# Patient Record
Sex: FEMALE | Race: WHITE | Employment: FULL TIME | URBAN - METROPOLITAN AREA
[De-identification: names, ages, dates, MRNs, and addresses within clinical notes are randomized per-mention and may not be internally consistent; named-entity substitution may affect disease eponyms.]

---

## 2018-07-23 ENCOUNTER — OFFICE VISIT (OUTPATIENT)
Dept: FAMILY MEDICINE CLINIC | Age: 23
End: 2018-07-23

## 2018-07-23 VITALS
HEART RATE: 82 BPM | WEIGHT: 188.6 LBS | TEMPERATURE: 98.2 F | SYSTOLIC BLOOD PRESSURE: 120 MMHG | OXYGEN SATURATION: 98 % | RESPIRATION RATE: 18 BRPM | HEIGHT: 67 IN | BODY MASS INDEX: 29.6 KG/M2 | DIASTOLIC BLOOD PRESSURE: 82 MMHG

## 2018-07-23 DIAGNOSIS — G43.109 MIGRAINE WITH AURA AND WITHOUT STATUS MIGRAINOSUS, NOT INTRACTABLE: ICD-10-CM

## 2018-07-23 DIAGNOSIS — Z00.00 WELL ADULT HEALTH CHECK: Primary | ICD-10-CM

## 2018-07-23 DIAGNOSIS — K58.0 IRRITABLE BOWEL SYNDROME WITH DIARRHEA: ICD-10-CM

## 2018-07-23 DIAGNOSIS — R29.898 HAND WEAKNESS: ICD-10-CM

## 2018-07-23 DIAGNOSIS — Z00.00 WELL ADULT HEALTH CHECK: ICD-10-CM

## 2018-07-23 DIAGNOSIS — R53.83 MALAISE AND FATIGUE: ICD-10-CM

## 2018-07-23 DIAGNOSIS — R53.81 MALAISE AND FATIGUE: ICD-10-CM

## 2018-07-23 DIAGNOSIS — N94.6 DYSMENORRHEA: ICD-10-CM

## 2018-07-23 DIAGNOSIS — R73.9 HYPERGLYCEMIA: ICD-10-CM

## 2018-07-23 LAB
A/G RATIO: 1.8 (ref 1.1–2.2)
ALBUMIN SERPL-MCNC: 4.8 G/DL (ref 3.4–5)
ALP BLD-CCNC: 77 U/L (ref 40–129)
ALT SERPL-CCNC: 26 U/L (ref 10–40)
ANION GAP SERPL CALCULATED.3IONS-SCNC: 16 MMOL/L (ref 3–16)
AST SERPL-CCNC: 23 U/L (ref 15–37)
BASOPHILS ABSOLUTE: 0.1 K/UL (ref 0–0.2)
BASOPHILS RELATIVE PERCENT: 1 %
BILIRUB SERPL-MCNC: 0.3 MG/DL (ref 0–1)
BUN BLDV-MCNC: 13 MG/DL (ref 7–20)
CALCIUM SERPL-MCNC: 10.2 MG/DL (ref 8.3–10.6)
CHLORIDE BLD-SCNC: 102 MMOL/L (ref 99–110)
CHOLESTEROL, TOTAL: 221 MG/DL (ref 0–199)
CO2: 23 MMOL/L (ref 21–32)
CREAT SERPL-MCNC: 0.7 MG/DL (ref 0.6–1.1)
EOSINOPHILS ABSOLUTE: 0.5 K/UL (ref 0–0.6)
EOSINOPHILS RELATIVE PERCENT: 7.7 %
GFR AFRICAN AMERICAN: >60
GFR NON-AFRICAN AMERICAN: >60
GLOBULIN: 2.6 G/DL
GLUCOSE BLD-MCNC: 88 MG/DL (ref 70–99)
HCT VFR BLD CALC: 40.6 % (ref 36–48)
HDLC SERPL-MCNC: 50 MG/DL (ref 40–60)
HEMOGLOBIN: 13.8 G/DL (ref 12–16)
LDL CHOLESTEROL CALCULATED: 119 MG/DL
LYMPHOCYTES ABSOLUTE: 2.9 K/UL (ref 1–5.1)
LYMPHOCYTES RELATIVE PERCENT: 40.9 %
MCH RBC QN AUTO: 30.5 PG (ref 26–34)
MCHC RBC AUTO-ENTMCNC: 33.9 G/DL (ref 31–36)
MCV RBC AUTO: 90 FL (ref 80–100)
MONOCYTES ABSOLUTE: 0.4 K/UL (ref 0–1.3)
MONOCYTES RELATIVE PERCENT: 6.1 %
NEUTROPHILS ABSOLUTE: 3.1 K/UL (ref 1.7–7.7)
NEUTROPHILS RELATIVE PERCENT: 44.3 %
PDW BLD-RTO: 12.9 % (ref 12.4–15.4)
PLATELET # BLD: 279 K/UL (ref 135–450)
PMV BLD AUTO: 9 FL (ref 5–10.5)
POTASSIUM SERPL-SCNC: 4.9 MMOL/L (ref 3.5–5.1)
RBC # BLD: 4.51 M/UL (ref 4–5.2)
SEDIMENTATION RATE, ERYTHROCYTE: 10 MM/HR (ref 0–20)
SODIUM BLD-SCNC: 141 MMOL/L (ref 136–145)
TOTAL PROTEIN: 7.4 G/DL (ref 6.4–8.2)
TRIGL SERPL-MCNC: 261 MG/DL (ref 0–150)
TSH SERPL DL<=0.05 MIU/L-ACNC: 3.08 UIU/ML (ref 0.27–4.2)
VLDLC SERPL CALC-MCNC: 52 MG/DL
WBC # BLD: 7 K/UL (ref 4–11)

## 2018-07-23 PROCEDURE — 99202 OFFICE O/P NEW SF 15 MIN: CPT | Performed by: FAMILY MEDICINE

## 2018-07-23 PROCEDURE — 99385 PREV VISIT NEW AGE 18-39: CPT | Performed by: FAMILY MEDICINE

## 2018-07-23 RX ORDER — NAPROXEN 500 MG/1
500 TABLET ORAL 2 TIMES DAILY PRN
Qty: 30 TABLET | Refills: 3 | COMMUNITY
Start: 2018-07-23 | End: 2019-10-18 | Stop reason: SDUPTHER

## 2018-07-23 RX ORDER — NORGESTIMATE AND ETHINYL ESTRADIOL 0.25-0.035
1 KIT ORAL DAILY
COMMUNITY
End: 2018-07-23

## 2018-07-23 RX ORDER — HYOSCYAMINE SULFATE EXTENDED-RELEASE 0.38 MG/1
0.38 TABLET ORAL EVERY 12 HOURS PRN
Qty: 60 TABLET | Refills: 5 | Status: SHIPPED | OUTPATIENT
Start: 2018-07-23

## 2018-07-23 RX ORDER — NORGESTIMATE AND ETHINYL ESTRADIOL 0.25-0.035
1 KIT ORAL DAILY
Qty: 1 PACKET | Refills: 3 | Status: SHIPPED | OUTPATIENT
Start: 2018-07-23 | End: 2019-10-18 | Stop reason: SDUPTHER

## 2018-07-23 ASSESSMENT — PATIENT HEALTH QUESTIONNAIRE - PHQ9
SUM OF ALL RESPONSES TO PHQ QUESTIONS 1-9: 0
SUM OF ALL RESPONSES TO PHQ9 QUESTIONS 1 & 2: 0
1. LITTLE INTEREST OR PLEASURE IN DOING THINGS: 0
2. FEELING DOWN, DEPRESSED OR HOPELESS: 0

## 2018-07-23 NOTE — PATIENT INSTRUCTIONS
INSTRUCTIONS  · NEXT APPOINTMENT: Please schedule check-up in 2 months. · PLEASE TAKE THIS FORM TO CHECK-OUT WINDOW TO SCHEDULE NEXT VISIT. · PLEASE GET BLOODWORK DRAWN TODAY ON FIRST FLOOR in 170. Take orders with you. RESULTS- most blood tests back in couple days. We will call you if any problems. If bloodwork good, you will get letter in mail or notified thru [address] (if signed up) within 2 weeks. If you do not, please call office. · Please get flu vaccine when available in fall. Can get either at this office or at stores such as [employer] and [address]. · Ask GYN to fax PAP and chlamydia results to Dr. Gray Loges at [phone]. · Call to get date of last tetanus. · See ortho and schedule EMG about hand. · Take levbid twice a day for bowels. · Increase fiber in diet. Patient Education     IRRITABLE BOWEL SYNDROME    Overview   What is irritable bowel syndrome? Irritable bowel syndrome (IBS) is a chronic (ongoing) problem with the large intestine. In people who have IBS, food moves too quickly or too slowly through the intestines. This can cause pain or discomfort (see symptoms below) and emotional distress, but it does not damage the large intestine. IBS is very common and occurs more often in women. IBS is also called functional bowel syndrome, irritable colon, spastic bowel and spastic colon. It's not the same as inflammatory bowel diseases like ulcerative colitis. Symptoms   What are the symptoms of IBS?   Common IBS symptoms  Bloating and gas   Mucus in the stool   Constipation   Diarrhea, especially after eating or first thing in the morning   Alternating between constipation and diarrhea   Feeling like you still need to have a bowel movement after you've already had one   Feeling a strong urge to have a bowel movement   Abdominal pain and cramping that may go away after having a bowel movement   The symptoms may get worse when you're under stress, such as when you travel, attend social events or change your daily routine. Your symptoms may also get worse if you don't eat enough healthy foods or after you've eaten a big meal. Some people are bothered by certain foods. Women who have IBS may notice more frequent symptoms during their menstrual periods. Causes & Risk Factors   Do certain foods cause IBS? No. Foods don't cause IBS. But some foods may make you feel worse. Foods that may make symptoms worse include the following:  Drinks with caffeine, such as coffee, tea or soda   Milk products   Alcohol   Chocolate   Wheat, rye or barley   Keeping a diary for a few weeks may be a good way to find out if a food bothers you. Record what you eat and what your symptoms are. If you notice a pattern or think a food makes you feel worse, don't eat it. But don't cut out foods unless they have caused you problems more than once. If gas is a problem for you, you might want to avoid foods that tend to make gas worse. These include beans, cabbage and some fruits. If milk and other dairy products bother you, you may have lactose intolerance. Lactose intolerance means that your body can't digest lactose (the sugar in milk). If this seems to be the case, you may need to limit the amount of milk and milk products in your diet. Talk to your family doctor if you think you have trouble digesting dairy products. How can stress affect IBS? Stress may trigger symptoms in people who have IBS. Talk to your family doctor about ways to deal with stress, such as exercise, relaxation training or meditation. He or she may have some suggestions or may refer you to someone who can give you some ideas. Your doctor may also suggest that you talk to a counselor about things that are bothering you. Diagnosis & Tests   How is IBS diagnosed? Your doctor may start by asking you questions about your symptoms. If your symptoms have had a pattern over time, the pattern may make it clear to your doctor that IBS is the cause.     If your symptoms have just started, something else may be the cause. Your doctor may need to do some tests, such as a blood test or colonoscopy, to make sure that your symptoms aren't caused by something else. Treatment   How is IBS treated? There is no cure for IBS. The best way to handle your symptoms is to eat a healthy diet, avoid foods that seem to make you feel worse and find ways to handle your stress. Why may fiber be helpful? Fiber can be helpful because it improves how the intestines work. There are 2 types of fiber:  Soluble fiber helps both diarrhea and constipation. It dissolves in water and forms a gel-like material. Many foods, such as apples, beans and citrus fruits, contain soluble fiber. Psyllium, a natural vegetable fiber, is a also a soluble fiber. You can buy psyllium supplements (some brand names: Fiberall, Metamucil, Perdiem) to drink and you can add it to other foods. Insoluble fiber helps constipation by moving material through your digestive system and adding bulk to your stool. Insoluble fiber is in whole grain breads, wheat bran and many vegetables. Increase the fiber in your diet slowly. Some people feel bloated and have gas if they increase their fiber intake too quickly. Gas and bloating usually improve as you get used to eating more fiber. The best way to increase your fiber intake is eat a wide variety of high fiber foods. Can my doctor prescribe medicine for IBS? If your symptoms are severe, your doctor may prescribe medicine to help you manage or lessen your symptoms. For example, if your main symptom is pain, your doctor may prescribe antispasmodic medicines such as hyoscyamine or dicyclomine to reduce cramping. Heating pads and hot baths can also be comforting. If diarrhea is a frequent problem, medicine such as loperamide (brand name: Imodium) may help.     Your doctor may give you tranquilizers or sedatives for short periods to treat anxiety that may be making should aim for at least 30 grams of fiber daily. Women 48years of age and younger should consume at least 25 grams of fiber per day, while women older than 48years of age should aim for at least 21 grams of fiber daily. Try the following ideas to increase the fiber in your diet:  Eat at least 2 cups of fruits and 2 1/2 cups of vegetables each day. Fruits and vegetables that are high in fiber include:   Beans such as:   navy (1/2 cup = 9.5 grams),   kidney (1/2 cup = 8.2 grams),   yuen (1/2 cup = 7.7 grams),   black (1/2 cup = 7.5),   lima (1/2 = 6.6 grams),   white (1/2 cup = 6.3 grams)   great northern (1/2 cup = 6.2 grams). Artichokes (1 artichoke = 6.5 grams)   Sweet potatoes (1 medium sweet potato = 4.8 grams)   Pears (I small pear = 4.4 grams)   Green peas (1/2 cup = 4.4 grams)   Berries such as raspberries (1/2 cup = 4.0 grams) and blackberries (1/2 cup = 3.8 grams)   Prunes (1/2 cup = 3.8 grams)   Figs and dates (1/4 cup = 3.6 grams)   Spinach (1/2 cup = 3.5 grams)   Apples (1 medium apple = 3.3 grams)   Oranges (I medium orange = 3.1 grams)   Replace refined white bread with whole-grain breads and cereals. Eat brown rice instead of white rice. Eat more of the following foods:   Bran muffins   Oatmeal   Bran or multiple-grain cereals, cooked or dry   Brown rice   Popcorn   100% whole-wheat bread   When eating store-bought foods, check the nutrition information labels for the amounts of dietary fiber in each product. Aim for 5 grams of fiber per serving. Add 1/4 cup of wheat bran (cotton's bran) to foods such as cooked cereal, applesauce or meat loaf. Eat beans each week. Start slowly  When you first add fiber to your diet you may notice bloating, cramping or gas. But you can prevent this by making smaller changes in your diet over a period of time. Start with one of the changes listed above, then wait several days to a week before making another.  If one change doesn't seem to work for you, try a

## 2018-07-23 NOTE — PROGRESS NOTES
for: TSH  No results found for: LABA1C  No results found for: PSA, PSADIA    PHYSICAL-VISIT NOTE   Subjective:     Chief Complaint   Patient presents with    New Patient       Deepali Buchanan is a 23 y.o. female who presents for annual testing/preventive review and check-up of medical problems listed below:  1. Well adult health check    2. Migraine with aura and without status migrainosus, not intractable    3. Dysmenorrhea    4. Hyperglycemia    5. Irritable bowel syndrome with diarrhea    6. Malaise and fatigue    7. Hand weakness       Complaints: diarrhea    Migraine with visual aura, less than 1/mo, able to stop most with fluids, rest and naproxen 500  Hx of high glucose and insulin on past.    Dairy, high sugar and greasy foods brings on diarrhea, several times a week. No blood, fever     Hands get stiff, L>R, L handed, worse 4th and 5th fingers after couple hours worse and curl up. Dropping things. Sometimes numb/no feeling. Loosens quickly in AM  X 2 years    Review of Systems  A comprehensive review of systems was negative with the EXCEPTION of notes above. See scanned in system review check-list.    Patient's medications, allergies, past medical, surgical, social and family histories were reviewed and updated as appropriate (see above). Objective:   PHYSICAL EXAM   /82 (Site: Right Arm, Position: Sitting, Cuff Size: Small Adult)   Pulse 82   Temp 98.2 °F (36.8 °C) (Oral)   Resp 18   Ht 5' 7\" (1.702 m)   Wt 188 lb 9.6 oz (85.5 kg)   LMP 07/18/2018   SpO2 98%   BMI 29.54 kg/m²   Blood pressure is Excellent. BP Readings from Last 5 Encounters:   07/23/18 120/82     Wt Readings from Last 5 Encounters:   07/23/18 188 lb 9.6 oz (85.5 kg)      GENERAL:   · well-developed, well-nourished, alert, no distress. EYES: glasses  · External findings: lids and lashes normal and conjunctivae and sclerae normal  · Eyes: no periorbital cellulitis.   ENT:   · External nose and ears appear

## 2018-07-24 LAB
ESTIMATED AVERAGE GLUCOSE: 99.7 MG/DL
HBA1C MFR BLD: 5.1 %

## 2018-08-30 ENCOUNTER — TELEPHONE (OUTPATIENT)
Dept: FAMILY MEDICINE CLINIC | Age: 23
End: 2018-08-30

## 2018-08-30 ENCOUNTER — HOSPITAL ENCOUNTER (OUTPATIENT)
Dept: NEUROLOGY | Age: 23
Discharge: HOME | End: 2018-08-30
Attending: FAMILY MEDICINE | Admitting: FAMILY MEDICINE

## 2018-08-30 DIAGNOSIS — R29.898 OTHER SYMPTOMS AND SIGNS INVOLVING THE MUSCULOSKELETAL SYSTEM: ICD-10-CM

## 2018-09-07 ENCOUNTER — OFFICE VISIT (OUTPATIENT)
Dept: ORTHOPEDIC SURGERY | Age: 23
End: 2018-09-07

## 2018-09-07 ENCOUNTER — TELEPHONE (OUTPATIENT)
Dept: ORTHOPEDIC SURGERY | Age: 23
End: 2018-09-07

## 2018-09-07 VITALS
HEART RATE: 75 BPM | BODY MASS INDEX: 29.51 KG/M2 | HEIGHT: 67 IN | SYSTOLIC BLOOD PRESSURE: 127 MMHG | DIASTOLIC BLOOD PRESSURE: 77 MMHG | WEIGHT: 188 LBS

## 2018-09-07 DIAGNOSIS — G56.03 BILATERAL CARPAL TUNNEL SYNDROME: ICD-10-CM

## 2018-09-07 DIAGNOSIS — G56.23 CUBITAL TUNNEL SYNDROME OF BOTH UPPER EXTREMITIES: ICD-10-CM

## 2018-09-07 DIAGNOSIS — R29.898 HAND WEAKNESS: Primary | ICD-10-CM

## 2018-09-07 PROCEDURE — 99243 OFF/OP CNSLTJ NEW/EST LOW 30: CPT | Performed by: ORTHOPAEDIC SURGERY

## 2018-09-07 NOTE — LETTER
CONSENT TO OPERATION  AND/OR OTHER PROCEDURE(S)          PATIENT : Miguelangel Hannah   YOB: 1995      DATE : 9/7/18          1. I request and consent that Dr. Anahi Rivas. Fabiano Ellison,  and/or his associates or assistants perform an operation and/or procedures on the above patient at  [place] [address], to treat the condition(s) which appear indicated by the diagnostic studies already performed. I have been told that in general terms the nature, purpose and reasonable expectations of the operation and/or procedure(s) are:       left  Ulnar Nerve Decompression  (at Elbow) &  Left Carpal Tunnel Release followed 3 week later by Right ulnar nerve decompression & Right Carpal tunnel release        2. It has been explained to me by the informing physician that during the course of the operation and/or procedure(s) unforeseen conditions may be revealed that necessitate an extension of the original operation and/or procedure(s) or different operation and/or procedures than those set forth in Paragraph 1. I therefore authorize and request that my physician and/or his associates or assistants perform such operations and/or procedures as are necessary and desirable in the exercise of professional judgment. The authority granted under this Paragraph 2 shall extend to all conditions that require treatment and are known to my physician at the time the operation is commenced. 3. I have been made aware by the informing physician of certain risks and consequences that are associated with the operation and/or procedure(s) described in Paragraph 1, the reasonable alternative methods or treatment, the possible consequences, the possibility that the operation and/or procedure(s) may be unsuccessful and the possibility of complications. I understand the reasonably known risks to be:      ? Bleeding  ? Infection  ? Poor Healing  ?  Possible Damage to Nerve, Vessel, Tendon/Muscle or Bone ? Need for further Treatment/Surgery  ? Stiffness  ? Pain  ? Residual or Recurrent Symptoms  ? Anesthetic and/or Medical Risks  ? 4. I have also been informed by the informing physician that there are other risks from both known and unknown causes that are attendant to the performance of any surgical procedure. I am aware that the practice of medicine and surgery is not an exact science, and that no guarantees have been made to me concerning the results of the operation and/or procedure(s). 5. I   CONSENT / REFUSE CONSENT  (strike the phrase that does not apply) to the taking of photographs before, during and/or after the operation or procedure for scientific/educational purposes. 6. I consent to the administration of anesthesia and to the use of such anesthetics as may be deemed advisable by the anesthesiologist who has been engaged by me or my physician. 7. I certify that I have read and understand the above consent to operation and/or other procedure(s); that the explanations therein referred to were made to me by the informing physician in advance of my signing this consent; that all blanks or statements requiring insertion or completion were filled in and inapplicable paragraphs, if any, were stricken before I signed; and that all questions asked by me about the operation and/or procedure(s) which I have consented to have been fully answered in a satisfactory manner.               _______________________  Witness        Signature Of Patient          Tegan Helms. Joselin De Los Santos      _______________________  Informing Physician         Signature of Informing Physician           If patient is unable to sign or is a minor, complete one of the following:    (A)  Patient is a minor   years of age. (B)  Patient is unable to sign because: The undersigned represents that he or she is duly authorized to execute this consent for and on behalf of the above named patient.

## 2018-09-07 NOTE — Clinical Note
Dear  Linda Alcala MD,  Thank you very much for your referral or Ms. Blake Holstein to me for evaluation and treatment of her Hand & Wrist condition. I appreciate your confidence in me and thank you for allowing me the opportunity to care for your patients. If I can be of any further assistance to you on this or any other patient, please do not hesitate to contact me. Sincerely,  Tegan Helms.  Joselin De Los Santos MD

## 2018-09-07 NOTE — PROGRESS NOTES
Ms. Dyana Mendez is a 23 y.o. left handed   who is seen today in Hand Surgical Consultation at the request of Ema Dela Cruz MD.    She presents today regarding bilateral Left greater than Right  symptoms which have been present for approximately 2 years. A history of antecedent trauma or injury is Absent. She reports symptoms to include severe numbness & tingling in the Ulnar Innervated Digits. Neurologic symptoms do Frequently awaken her from sleep. She reports mild pain located in the Medial both elbows, with antegrade radiation . Symptoms show no change over time. Previous treatment has included has avoided aggravating activity for a few months, which has been somewhat effective. She does not claim relation of her symptoms to her required work activities. She has undergone electrodiagnostic testing. The patient's , past medical history, medications, allergies,  family history, social history, and review of systems have been reviewed, and dated and are recorded in the chart. Physical Exam:  Ms. Mike Driver most recent vitals:  Vitals  BP: 127/77  Pulse: 75  Height: 5' 7\" (170.2 cm)  Weight: 188 lb (85.3 kg)    She is well nourished, oriented to person, place & time. She demonstrates appropriate mood and affect as well as normal gait and station. Skin: Skin color, texture, turgor normal. No rashes or lesions bilaterally  Digital range of motion is full and equal bilateral otherwise normal bilaterally  Wrist range of motion is full and equal bilateral otherwise normal bilaterally  Elbow range of motion is full and equal bilaterally otherwise normal bilaterally  Sensation is subjectively tingling in the Ulnar Innervated Digits and objectively normal in the same distribution.   All other digits show normal sensation  Vascular examination reveals normal and good capillary refill bilaterally  Swelling is minimal about the elbow on the Left, greater than Right  There is no evidence of gross joint instability bilaterally. Muscular strength is clinically appropriate bilaterally. Examination for Cubital Tunnel Syndrome bilaterally shows no tenderness to palpation at the medial epicondyle. The Ulnar Nerve rests behind the medial epicondyle without subluxation upon elbow flexion. Elbow flexion-compression test is mildly positive, and there is an active Tinnel's Sign over the Cubital Tunnel. The Ulnar Nerve innervated intrinsic musculature is not atrophied & weakened. Examination for Carpal Tunnel Syndrome shows Carpal Tunnel Compression Test to be negative bilaterally. Phalen's Maneuver is negative bilaterally. The thenar musculature shows no evidence of atrophy or weakness. Review of Electrodiagnostic Testing:  Test performed on: 8/30/18    NERVE CONDUCTION STUDY:  RIGHT   Median Nerve: Sensory Latency:    Motor Latency:    Ulnar Nerve:  Conduction Velocity:       LEFT  Median Nerve: Sensory Latency: 3.9  Motor Latency: 3.3  Ulnar Nerve:  Conduction Velocity:  38    EMG:  RIGHT  Not Performed    LEFT  Normal          Impression:  Ms. 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 Plan:  I will ask Ms. Dima Lovett to undergo electrodiagnostic studies of the symptomatic right side. I will ask that she schedule a follow-up appointment with me to review the results of this study after it has been completed. After discussion of the treatment options available for cubital tunnel syndrome and review of her past treatments, I have outlined for Ms. Dima Sloan the surgical treatment of Ulnar Nerve entrapment at the elbow and release of the Cubital Tunnel.   I have discussed the details of the surgical procedure, the pre, franchesca and postoperative concerns and the appropriate expectations after surgery. She was given the opportunity to ask questions, voiced an understanding of the procedure, and she did wish to proceed with bilateral, staged Ulnar Nerve Decompression at the Elbow. I had an extensive discussion with Ms. Arjun Che and any family members present regarding the natural history, etiology, and long term consequences of this problem. I have outlined a treatment plan with them and, in my opinion, surgical intervention is indicated at this time. I have discussed with them the potential complications, limitations, expectations, alternatives, and risks of Cubital Tunnel Release(s). They have had full opportunity to ask their questions. I have answered them all to their satisfaction. I feel that the patient and any present family members do understand our discussion today and they have provided informed consent for bilateral, staged Ulnar Nerve Decompression at the Elbow. After discussion of the treatment options available for carpal tunnel syndrome and review of her past treatments, I have outlined for Ms. Arjun Che the surgical treatment of carpal tunnel syndrome and release of the transverse carpal ligament. I have discussed the details of the surgical procedure, the pre, franchesca and postoperative concerns and the appropriate expectations after surgery. She was given the opportunity to ask questions, voiced an understanding of the procedure, and she did wish to proceed with bilateral and staged carpal tunnel release(s). I had an extensive discussion with Ms. Arjun Che and any family members present regarding the natural history, etiology, and long term consequences of this problem. I have outlined a treatment plan with them and, in my opinion, surgical intervention is indicated at this time. I have discussed with them the potential complications, limitations, expectations, alternatives, and risks of Carpal Tunnel Release(s).   They have had full opportunity to ask their questions. I have answered them all to their satisfaction. I feel that the patient and any present family members do understand our discussion today and they have provided informed consent for bilateral and staged carpal tunnel release(s). I have also discussed with Ms. Fausto Rose the other treatment options available to her for this condition. We have today selected to proceed with Surgical treatment. She has voiced and  understanding that there are other less aggressive treatment options which are available in this situation, albeit possibly less efficacious or durable, and she is comfortable with the plan that she has chosen. Ms. Fausto Rose has been given a full verbal list of instructions and precautions related to her present condition. I have asked her to followup with me in the office at the prescribed time. She is also specifically requested to call or return to the office sooner if her symptoms change or worsen prior to the next scheduled appointment.

## 2019-10-18 ENCOUNTER — HOSPITAL ENCOUNTER (OUTPATIENT)
Age: 24
Discharge: HOME | End: 2019-10-18
Payer: COMMERCIAL

## 2019-10-18 ENCOUNTER — HOSPITAL ENCOUNTER (OUTPATIENT)
Dept: GENERAL RADIOLOGY | Age: 24
Discharge: HOME | End: 2019-10-18
Payer: COMMERCIAL

## 2019-10-18 ENCOUNTER — OFFICE VISIT (OUTPATIENT)
Dept: FAMILY MEDICINE CLINIC | Age: 24
End: 2019-10-18
Payer: COMMERCIAL

## 2019-10-18 VITALS
BODY MASS INDEX: 27 KG/M2 | SYSTOLIC BLOOD PRESSURE: 104 MMHG | HEIGHT: 67 IN | DIASTOLIC BLOOD PRESSURE: 72 MMHG | WEIGHT: 172 LBS | HEART RATE: 84 BPM | RESPIRATION RATE: 16 BRPM

## 2019-10-18 DIAGNOSIS — Z23 NEEDS FLU SHOT: ICD-10-CM

## 2019-10-18 DIAGNOSIS — Z01.419 WELL WOMAN EXAM: ICD-10-CM

## 2019-10-18 DIAGNOSIS — G43.109 MIGRAINE WITH AURA AND WITHOUT STATUS MIGRAINOSUS, NOT INTRACTABLE: ICD-10-CM

## 2019-10-18 DIAGNOSIS — N94.6 DYSMENORRHEA: ICD-10-CM

## 2019-10-18 DIAGNOSIS — S86.891A SHIN SPLINTS, RIGHT, INITIAL ENCOUNTER: ICD-10-CM

## 2019-10-18 DIAGNOSIS — G56.22 ENTRAPMENT OF LEFT ULNAR NERVE: ICD-10-CM

## 2019-10-18 DIAGNOSIS — Z01.419 ENCOUNTER FOR WELL WOMAN EXAM WITH ROUTINE GYNECOLOGICAL EXAM: Primary | ICD-10-CM

## 2019-10-18 PROBLEM — R29.898 HAND WEAKNESS: Status: RESOLVED | Noted: 2018-07-23 | Resolved: 2019-10-18

## 2019-10-18 PROCEDURE — 99395 PREV VISIT EST AGE 18-39: CPT | Performed by: FAMILY MEDICINE

## 2019-10-18 PROCEDURE — 90471 IMMUNIZATION ADMIN: CPT | Performed by: FAMILY MEDICINE

## 2019-10-18 PROCEDURE — 90686 IIV4 VACC NO PRSV 0.5 ML IM: CPT | Performed by: FAMILY MEDICINE

## 2019-10-18 PROCEDURE — 73590 X-RAY EXAM OF LOWER LEG: CPT

## 2019-10-18 RX ORDER — NAPROXEN 500 MG/1
500 TABLET ORAL 2 TIMES DAILY PRN
Qty: 30 TABLET | Refills: 3 | Status: SHIPPED | OUTPATIENT
Start: 2019-10-18 | End: 2022-09-29 | Stop reason: ALTCHOICE

## 2019-10-18 RX ORDER — NORGESTIMATE AND ETHINYL ESTRADIOL 0.25-0.035
1 KIT ORAL DAILY
Qty: 1 PACKET | Refills: 3 | Status: SHIPPED | OUTPATIENT
Start: 2019-10-18 | End: 2020-02-24 | Stop reason: SDUPTHER

## 2019-10-18 ASSESSMENT — PATIENT HEALTH QUESTIONNAIRE - PHQ9
SUM OF ALL RESPONSES TO PHQ QUESTIONS 1-9: 0
SUM OF ALL RESPONSES TO PHQ QUESTIONS 1-9: 0
2. FEELING DOWN, DEPRESSED OR HOPELESS: 0
SUM OF ALL RESPONSES TO PHQ9 QUESTIONS 1 & 2: 0
1. LITTLE INTEREST OR PLEASURE IN DOING THINGS: 0

## 2019-10-22 LAB
HPV COMMENT: NORMAL
HPV TYPE 16: NOT DETECTED
HPV TYPE 18: NOT DETECTED
HPVOH (OTHER TYPES): NOT DETECTED

## 2019-12-09 ENCOUNTER — OFFICE VISIT (OUTPATIENT)
Dept: FAMILY MEDICINE CLINIC | Age: 24
End: 2019-12-09
Payer: COMMERCIAL

## 2019-12-09 VITALS
DIASTOLIC BLOOD PRESSURE: 68 MMHG | WEIGHT: 178 LBS | RESPIRATION RATE: 16 BRPM | HEIGHT: 67 IN | BODY MASS INDEX: 27.94 KG/M2 | HEART RATE: 67 BPM | SYSTOLIC BLOOD PRESSURE: 112 MMHG

## 2019-12-09 DIAGNOSIS — L02.92 FURUNCLE: Primary | ICD-10-CM

## 2019-12-09 DIAGNOSIS — Z23 NEED FOR VIRAL IMMUNIZATION: ICD-10-CM

## 2019-12-09 PROCEDURE — 90471 IMMUNIZATION ADMIN: CPT | Performed by: FAMILY MEDICINE

## 2019-12-09 PROCEDURE — 90716 VAR VACCINE LIVE SUBQ: CPT | Performed by: FAMILY MEDICINE

## 2019-12-09 PROCEDURE — 90472 IMMUNIZATION ADMIN EACH ADD: CPT | Performed by: FAMILY MEDICINE

## 2019-12-09 PROCEDURE — 99213 OFFICE O/P EST LOW 20 MIN: CPT | Performed by: FAMILY MEDICINE

## 2019-12-09 PROCEDURE — 90715 TDAP VACCINE 7 YRS/> IM: CPT | Performed by: FAMILY MEDICINE

## 2019-12-09 RX ORDER — SULFAMETHOXAZOLE AND TRIMETHOPRIM 800; 160 MG/1; MG/1
1 TABLET ORAL 2 TIMES DAILY
Qty: 10 TABLET | Refills: 0 | Status: SHIPPED | OUTPATIENT
Start: 2019-12-09 | End: 2019-12-14

## 2019-12-09 RX ORDER — CHLORHEXIDINE GLUCONATE 4 G/100ML
SOLUTION TOPICAL
Qty: 473 ML | Refills: 5 | Status: SHIPPED | OUTPATIENT
Start: 2019-12-09 | End: 2019-12-23

## 2020-01-08 ENCOUNTER — OFFICE VISIT (OUTPATIENT)
Dept: FAMILY MEDICINE CLINIC | Age: 25
End: 2020-01-08
Payer: COMMERCIAL

## 2020-01-08 VITALS
BODY MASS INDEX: 28.41 KG/M2 | HEIGHT: 67 IN | RESPIRATION RATE: 16 BRPM | HEART RATE: 75 BPM | SYSTOLIC BLOOD PRESSURE: 118 MMHG | TEMPERATURE: 98.4 F | WEIGHT: 181 LBS | OXYGEN SATURATION: 98 % | DIASTOLIC BLOOD PRESSURE: 74 MMHG

## 2020-01-08 PROCEDURE — 99213 OFFICE O/P EST LOW 20 MIN: CPT | Performed by: FAMILY MEDICINE

## 2020-01-08 RX ORDER — ALBUTEROL SULFATE 90 UG/1
2 AEROSOL, METERED RESPIRATORY (INHALATION) EVERY 4 HOURS PRN
Qty: 1 INHALER | Refills: 0 | Status: SHIPPED | OUTPATIENT
Start: 2020-01-08 | End: 2021-07-30 | Stop reason: ALTCHOICE

## 2020-01-08 ASSESSMENT — PATIENT HEALTH QUESTIONNAIRE - PHQ9
SUM OF ALL RESPONSES TO PHQ QUESTIONS 1-9: 0
SUM OF ALL RESPONSES TO PHQ QUESTIONS 1-9: 0
2. FEELING DOWN, DEPRESSED OR HOPELESS: 0
1. LITTLE INTEREST OR PLEASURE IN DOING THINGS: 0
SUM OF ALL RESPONSES TO PHQ9 QUESTIONS 1 & 2: 0

## 2020-01-08 NOTE — PATIENT INSTRUCTIONS
antibiotics to cure every illness. Do not take antibiotics for viral illnesses, such as for colds or the flu. Often, the best thing you can do is let colds and the flu run their course. Sometimes this can take 2 weeks or more. If your illness gets worse after 2 weeks, talk to your doctor. He or she can also give you advice on what you can do to relieve your symptoms while your body fights off the virus. How do I know when I need antibiotics? The answer depends on what is causing your infection. The following are some basic guidelines:  Colds and flu. Viruses cause these illnesses. They can't be cured with antibiotics. Cough or bronchitis. Viruses almost always cause these. However, if you have a problem with your lungs or an illness that lasts a long time, bacteria may actually be the cause. Your doctor may decide to try using an antibiotic. Sore throat. Most sore throats are caused by viruses and don't need antibiotics. However, strep throat is caused by bacteria. Your doctor can determine if you have strep throat and can prescribe an antibiotic. Ear infections. There are several types of ear infections. Antibiotics are used for some (but not all) ear infections. Sinus infections. Antibiotics are often used to treat sinus infections. However, a runny nose and yellow or green mucus do not necessarily mean you need an antibiotic. What else do I need to know? If your doctor does prescribe an antibiotic for you, make sure you take all of the medicine, even if you feel better after a few days. This reduces the chance that there will be any bacteria left in your body that could potentially become resistant to antibiotics. Never take antibiotics without a prescription. If, for whatever reason, you have antibiotics leftover from a time when you were previously sick, do not take them unless your doctor tells you it's okay. The leftover antibiotics may not work on whatever is making you sick.  If they do

## 2020-01-08 NOTE — PROGRESS NOTES
UPPER RESPIRATORY VISIT  Subjective:      Chief Complaint   Patient presents with    Cough      Oc Scott is a 30 y.o. female who presents for evaluation of symptoms of URI. Onset of symptoms was 5 days ago. Symptoms include congestion, facial pain, headache described as throbbings , lightheadedness, nasal congestion, post nasal drip, productive cough with  green colored sputum, sinus pressure and sore throat and are gradually worsening since that time. Other symptoms: feels hot, then chills  Denies: low grade fever, shortness of breath, sneezing and wheezing. Tried OTC: antihistamine-decongestant of choice with no relief of symptoms     Other issues: pt states she had flu over jonas, this started a few days later    Review of Systems   General ROS: fever? No,    Night sweats? No  Ophthalmic ROS: change in vision? No  Endocrine ROS: fatigue? Yes   Unexpected weight changes? No  Hematological and Lymphatic ROS: easy bruising? No   Swollen lymph nodes? No  ENT ROS: headaches? Yes   Sore throat? Yes  Respiratory ROS: cough? Yes   Wheezing? No  Cardiovascular ROS: chest pain? No   Shortness of breath? No  Gastrointestinal ROS: abdominal pain? No   Change in stools? No    *Chief complaint, HPI and History provided by the medical assistant has been reviewed and verified by provider Jeff Elliott MD    HISTORY:  Patient's medications, allergies, past medical, and social histories were reviewed and updated as appropriate (See above). CHART REVIEW  Health Maintenance   Topic Date Due    HPV vaccine (1 - Female 2-dose series) 12/09/2004    HIV screen  12/09/2008    Varicella Vaccine (2 of 2 - 13+ 2-dose series) 01/06/2020    Cervical cancer screen  10/18/2022    DTaP/Tdap/Td vaccine (2 - Td) 12/09/2029    Flu vaccine  Completed    Pneumococcal 0-64 years Vaccine  Aged Out     The ASCVD Risk score (Paola Matthew., et al., 2013) failed to calculate for the following reasons:     The 2013 ASCVD risk score Objective:   PHYSICAL EXAM  /74 (Site: Right Upper Arm, Position: Sitting, Cuff Size: Large Adult)   Pulse 75   Resp 16   Ht 5' 7\" (1.702 m)   Wt 181 lb (82.1 kg)   SpO2 98%   BMI 28.35 kg/m²   Wt Readings from Last 3 Encounters:   01/08/20 181 lb (82.1 kg)   12/09/19 178 lb (80.7 kg)   10/18/19 172 lb (78 kg)     BP Readings from Last 3 Encounters:   01/08/20 118/74   12/09/19 112/68   10/18/19 104/72     GENERAL: well-developed, well-nourished, alert, no distress  EYES: negative findings: lids and lashes normal and conjunctivae and sclerae normal  ENT: normal TM's and external ear canals both ears  · External nose and ears appear normal  · Pharynx: red, cobblestoned. Exudates: None  · Lips, mucosa, and tongue normal and teeth normal  · Hearing grossly normal  NECK: Supple, symmetrical, trachea midline  · Thyroid not enlarged, symmetric, no tenderness/mass/nodules  · no cervical nodes, no supraclavicular nodes  LUNGS:  Breathing unlabored  · clear to auscultation bilaterally,  good air movement  CARDIOVASC: regular rate and rhythm    Assessment/Plan:      Diagnosis Orders   1. Viral URI     Explained lack of efficacy of antibiotics in viral disease. MA- please check temp. Let me know if fever. · May take Mucinex (guaifenisen) and Robitussin DM (guafenisen, dextromethorphan) for cough and congestion. May also try Vicks Vaporub. · AVOID decongestants like phenylephrine and pseudoephedrine. AVOID Afrin. .  · May take ibuprofen (Motrin, Advil) 200 mg tabs up to 4 tabs every 8 hours. May also take acetaminophen (Tylenol) as instructed on packaging. · Please call if symptoms getting worse.

## 2020-02-25 RX ORDER — NORGESTIMATE AND ETHINYL ESTRADIOL 0.25-0.035
1 KIT ORAL DAILY
Qty: 1 PACKET | Refills: 3 | Status: SHIPPED | OUTPATIENT
Start: 2020-02-25 | End: 2020-06-19

## 2020-09-04 RX ORDER — NORGESTIMATE AND ETHINYL ESTRADIOL 0.25-0.035
1 KIT ORAL DAILY
Qty: 28 TABLET | Refills: 2 | Status: SHIPPED | OUTPATIENT
Start: 2020-09-04 | End: 2020-12-04 | Stop reason: SDUPTHER

## 2020-11-02 ENCOUNTER — OFFICE VISIT (OUTPATIENT)
Dept: FAMILY MEDICINE CLINIC | Age: 25
End: 2020-11-02
Payer: COMMERCIAL

## 2020-11-02 VITALS
WEIGHT: 179 LBS | RESPIRATION RATE: 16 BRPM | BODY MASS INDEX: 28.09 KG/M2 | DIASTOLIC BLOOD PRESSURE: 78 MMHG | TEMPERATURE: 98.2 F | HEART RATE: 78 BPM | SYSTOLIC BLOOD PRESSURE: 110 MMHG | OXYGEN SATURATION: 99 % | HEIGHT: 67 IN

## 2020-11-02 PROBLEM — G89.29 CHRONIC PAIN OF RIGHT KNEE: Status: ACTIVE | Noted: 2020-11-02

## 2020-11-02 PROBLEM — M25.561 CHRONIC PAIN OF RIGHT KNEE: Status: ACTIVE | Noted: 2020-11-02

## 2020-11-02 PROBLEM — R10.2 PELVIC PAIN: Status: ACTIVE | Noted: 2020-11-02

## 2020-11-02 PROCEDURE — 90716 VAR VACCINE LIVE SUBQ: CPT | Performed by: FAMILY MEDICINE

## 2020-11-02 PROCEDURE — 90471 IMMUNIZATION ADMIN: CPT | Performed by: FAMILY MEDICINE

## 2020-11-02 PROCEDURE — 99395 PREV VISIT EST AGE 18-39: CPT | Performed by: FAMILY MEDICINE

## 2020-11-02 PROCEDURE — 99213 OFFICE O/P EST LOW 20 MIN: CPT | Performed by: FAMILY MEDICINE

## 2020-11-02 NOTE — PROGRESS NOTES
Hepatitis A vaccine  Aged Out    Hepatitis B vaccine  Aged Out    Hib vaccine  Aged Out    Meningococcal (ACWY) vaccine  Aged Out    Pneumococcal 0-64 years Vaccine  Aged Out    HIV screen  Discontinued     The ASCVD Risk score (Emani Kingston, et al., 2013) failed to calculate for the following reasons: The 2013 ASCVD risk score is only valid for ages 36 to 78  Prior to Visit Medications    Medication Sig Taking?  Authorizing Provider   norgestimate-ethinyl estradiol ([address] [address]) 0.25-35 MG-MCG per tablet Take 1 tablet by mouth daily Yes Gilma Rice MD   albuterol sulfate  (90 Base) MCG/ACT inhaler Inhale 2 puffs into the lungs every 4 hours as needed for Wheezing or Shortness of Breath Yes Gilma Rice MD   naproxen (NAPROXEN) 500 MG EC tablet Take 1 tablet by mouth 2 times daily as needed for Pain Yes Gilma Rice MD   hyoscyamine (LEVBID) 375 MCG extended release tablet Take 1 tablet by mouth every 12 hours as needed for Cramping Yes Gilma Rice MD      Family History   Problem Relation Age of Onset    Heart Disease Maternal Grandfather      Social History     Tobacco Use    Smoking status: Never Smoker    Smokeless tobacco: Never Used   Substance Use Topics    Alcohol use: Yes     Comment: very rarely    Drug use: No      LAST LABS  Cholesterol, Total   Date Value Ref Range Status   07/23/2018 221 (H) 0 - 199 mg/dL Final     LDL Calculated   Date Value Ref Range Status   07/23/2018 119 (H) <100 mg/dL Final     HDL   Date Value Ref Range Status   07/23/2018 50 40 - 60 mg/dL Final     Triglycerides   Date Value Ref Range Status   07/23/2018 261 (H) 0 - 150 mg/dL Final     Lab Results   Component Value Date    GLUCOSE 88 07/23/2018     Lab Results   Component Value Date     07/23/2018    K 4.9 07/23/2018    CREATININE 0.7 07/23/2018     Lab Results   Component Value Date    WBC 7.0 07/23/2018    HGB 13.8 07/23/2018    HCT 40.6 07/23/2018    MCV 90.0 07/23/2018     07/23/2018 Lab Results   Component Value Date    ALT 26 07/23/2018    AST 23 07/23/2018    ALKPHOS 77 07/23/2018    BILITOT 0.3 07/23/2018     TSH (uIU/mL)   Date Value   07/23/2018 3.08     Lab Results   Component Value Date    LABA1C 5.1 07/23/2018     Objective:   PHYSICAL EXAM   /78 (Site: Right Upper Arm, Position: Sitting, Cuff Size: Large Adult)   Pulse 78   Temp 98.2 °F (36.8 °C) (Temporal)   Resp 16   Ht 5' 7\" (1.702 m)   Wt 179 lb (81.2 kg)   SpO2 99%   BMI 28.04 kg/m²   BP Readings from Last 5 Encounters:   11/02/20 110/78   01/08/20 118/74   12/09/19 112/68   10/18/19 104/72   09/07/18 127/77     Wt Readings from Last 5 Encounters:   11/02/20 179 lb (81.2 kg)   01/08/20 181 lb (82.1 kg)   12/09/19 178 lb (80.7 kg)   10/18/19 172 lb (78 kg)   09/07/18 188 lb (85.3 kg)      GENERAL:   · well-developed, well-nourished, alert, no distress. EYES:   · External findings: lids and lashes normal and conjunctivae and sclerae normal  · Eyes: no periorbital cellulitis. ENT:   · External nose and ears appear normal  · normal TM's and external ear canals both ears  · Pharynx: normal. Exudates: None  · Lips, mucosa, and tongue normal  · Hearing grossly normal.     NECK:   · Supple, symmetrical, trachea midline  · Thyroid not enlarged, symmetric, no tenderness/mass/nodules  LYMPH:  · no cervical nodes, no supraclavicular nodes  LUNGS:    · Breathing unlabored  · clear to auscultation bilaterally and good air movement  CARDIOVASC:   · regular rate and rhythm, S1, S2 normal. No murmur, click, rub or gallop  · Apical impulse normal  · LEGS:  Lower extremity edema: none    ABDOMEN:   · Soft, non-tender, no masses  · No hepatosplenomegaly  · No hernias noted.   Exam limited by N/A  SKIN: warm and dry  · No rashes or suspicious lesions  · No nodules or induration  PSYCH:    · Alert and oriented  · Normal reasoning, insight good  · Facial expressions full, mood appropriate  · No memory disturbance noted  MUSCULOSKEL: · Gait normal, assistive device: none  · No significant finger or nail findings  · Spine symmetric, no deformities, no kyphosis      Assessment and Plan:      Diagnosis Orders   1. Well adult health check     2. Need for varicella vaccine  Varicella vaccine subcutaneous   3. Polyuria  Urinalysis    Comprehensive Metabolic Panel   4. Polydipsia  Urinalysis    Comprehensive Metabolic Panel   5. Hypertriglyceridemia  Lipid Panel   6. Migraine with aura and without status migrainosus, not intractable     7. Chronic pain of right knee     8. Pelvic pain     Stable. Plan as above and below. INSTRUCTIONS  · NEXT APPOINTMENT: Please schedule annual complete physical (30 minutes) in 1 month. · PLEASE TAKE THIS FORM TO CHECK-OUT WINDOW TO SCHEDULE NEXT VISIT. · PLEASE GET BLOODWORK DRAWN TODAY ON FIRST FLOOR in 170. Take orders with you. RESULTS- most blood tests back in couple days. We will call you if any problems. If bloodwork good, you will get letter in mail or notified thru [address] (if signed up) within 2 weeks. If you do not, please call office. · Get records release of immunizations from childhood in [place]. · When ready for baby, should have 3 periods off birth control. · Will refer to ortho for knee when bad enough. Suspect medial meniscus tear. · Get pelvic ultrasound. May need referral to GYN.

## 2020-11-02 NOTE — PATIENT INSTRUCTIONS
INSTRUCTIONS  · NEXT APPOINTMENT: Please schedule annual complete physical (30 minutes) in 1 month. · PLEASE TAKE THIS FORM TO CHECK-OUT WINDOW TO SCHEDULE NEXT VISIT. · PLEASE GET BLOODWORK DRAWN TODAY ON FIRST FLOOR in 170. Take orders with you. RESULTS- most blood tests back in couple days. We will call you if any problems. If bloodwork good, you will get letter in mail or notified thru [address] (if signed up) within 2 weeks. If you do not, please call office. · Get records release of immunizations from childhood in [place]. · When ready for baby, should have 3 periods off birth control. · Will refer to ortho for knee when bad enough. Suspect medial meniscus tear. · Get pelvic ultrasound. May need referral to GYN. Patient Education                 Meniscal (Cartilage) Tear Rehabilitation Exercises     You may do the first 5 exercises right away. You may do the rest of the exercises when the pain in your knee has decreased. Standing calf stretch: Facing a wall, put your hands against the wall at about eye level. Keep the injured leg back, the uninjured leg forward, and the heel of your injured leg on the floor. Turn your injured foot slightly inward (as if you were pigeon-toed) as you slowly lean into the wall until you feel a stretch in the back of your calf. Hold for 15 to 30 seconds. Repeat 3 times. Do this exercise several times each day. Hamstring stretch on wall: Lie on your back with your buttocks close to a doorway, and extend your legs straight out in front of you along the floor. Raise the injured leg and rest it against the wall next to the door frame. Your other leg should extend through the doorway. You should feel a stretch in the back of your thigh. Hold this position for 15 to 30 seconds. Repeat 3 times. Straight leg raise: Lie on your back with your legs straight out in front of you.  Tighten up the top of your thigh muscle on the injured leg and lift that leg about 8 inches off the floor, keeping the thigh muscle tight throughout. Slowly lower your leg back down to the floor. Do 3 sets of 10. Heel slide: Sit on a firm surface with your legs straight in front of you. Slowly slide the heel of your injured leg toward your buttock by pulling your knee to your chest as you slide. Return to the starting position. Do 3 sets of 10. Passive knee extension: Do this exercise if you are unable to fully extend your knee. While lying on your back, place a rolled up towel underneath the heel of you injured leg so it is about 6 inches off the ground. Relax your leg muscles and let gravity slowly straighten your knee. You may feel some discomfort while doing this exercise. Try to hold this position for 2 minutes. Repeat 3 times. Do this exercise several times per day. This exercise can also be done while sitting in a chair with your heel on another chair or stool. Wall squat with a ball: Stand with your back, shoulders, and head against a wall and look straight ahead. Keep your shoulders relaxed and your feet 1 foot away from the wall and a shoulder's width apart. Place a rolled up pillow or a soccer-sized ball between your thighs. Keeping your head against the wall, slowly squat while squeezing the pillow or ball at the same time. Squat down until you are almost in a sitting position. Your thighs will not yet be parallel to the floor. Hold this position for 10 seconds and then slowly slide back up the wall. Make sure you keep squeezing the pillow or ball throughout this exercise. Repeat 10 times. Build up to 3 sets of 10. Step-up: Stand with the foot of your injured leg on a support (like a block of wood) 3 to 5 inches high. Keep your other foot flat on the floor. Shift your weight onto the injured leg and straighten the knee as the uninjured leg comes off the floor. Lower your uninjured leg to the floor slowly. Do 3 sets of 10.    Knee stabilization: Wrap a piece of elastic tubing around the How does it occur? A meniscal tear can occur when the knee is forcefully twisted or sometimes with minimal or no trauma, such as when you are squatting. What are the symptoms? Symptoms may include the following: You have pain in your knee joint. You have immediate swelling with fluid in the joint, called an effusion. You can't fully bend or straighten your leg. Your knee locks or gets stuck in one place. You hear a snap or pop at the time of the injury. A chronic (old) meniscal tear may give you pain on and off during activities, with or without swelling. Your knee may sometimes lock, and you may have stiffness in the knee. How is it diagnosed? Your health care provider will review your symptoms and how the injury occurred. He or she will ask about your medical history and examine your knee. Your provider will move your knee in several ways that may cause pain along the injured meniscal surface. You may have x-rays to see if the bones in your knee are injured, but a meniscal tear will not show on an x-ray. An MRI scan (magnetic resonance imaging) can help diagnose a meniscal tear. How is it treated? Treatment may include:   applying ice to your knee for 20 to 30 minutes every 3 to 4 hours for 2 or 3 days or until the pain and swelling are gone   elevating your knee by placing a pillow underneath your leg (to help reduce swelling)   wrapping an elastic bandage around your knee to keep the swelling from getting worse   wearing a knee immobilizer or other brace to prevent further injury   using crutches   taking anti-inflammatory or pain medication prescribed by your health care provider. While you are recovering from your injury, you will need to change your sport or activity to one that does not make your condition worse. For example, you may need to swim instead of run. Arthroscopic surgery is needed to repair or remove large torn pieces of cartilage.  The surgery usually takes about an hour. An arthroscope is a tube with a light on the end that projects an image of the inside of your knee onto a TV screen. By putting tools through the end of the arthroscope, the doctor can usually repair or remove the damaged meniscus. Because the meniscus is a valuable shock absorber, the doctor will leave as much of the healthy portion of the meniscus as possible during surgery. You will go home the day of the surgery. You should keep your leg elevated. Take it easy for at least the next 2 to 3 days. Do not take part in strenuous activities until your health care provider feels you are ready. How long will the effects last?   If you have a small tear that has not been repaired or removed, you may still be able to function well and be active. However, your knee may sometimes swell, lock, be stiff, or hurt during activities. If you have surgery, you will need to spend time rehabilitating your knee. Everyone recovers at a different rate, depending on the severity of the injury and their general health. Many people return to their previous level of activity within a month or so after surgery. When can I return to my sport or activity? The goal of rehabilitation is to return you to your sport or activity as soon as is safely possible. If you return too soon you may worsen your injury, which could lead to permanent damage. Everyone recovers from injury at a different rate. Return to your sport or activity will be determined by how soon your knee recovers, not by how many days or weeks it has been since your injury occurred. In general, the longer you have symptoms before you start treatment, the longer it will take to get better. You may safely return to your sport or activity when, starting from the top of the list and progressing to the end, each of the following is true:   Your injured knee can be fully straightened and bent without pain.    Your knee and leg have regained normal strength compared to the uninjured knee and leg. Your knee is not swollen. You are able to jog straight ahead without limping. You are able to sprint straight ahead without limping. You are able to do 45-degree cuts. You are able to do 90-degree cuts. You are able to do 20-yard figure-of-eight runs. You are able to do 10-yard figure-of-eight runs. You are able to jump on both legs without pain and jump on the injured leg without pain. If you feel that your knee is giving way or if you develop pain or have swelling in your knee, you should see your provider. How can a meniscal tear be prevented? Unfortunately, most injuries to knee cartilage occur during accidents that are not preventable. However, you may be able to avoid these injuries by:   having strong thigh and hamstring muscles   gently stretching your legs before and after exercise   wearing shoes that fit properly when you exercise and that are right for the activity you're doing. When skiing, be sure that your ski bindings are set correctly by a trained professional so that your skis will release when you fall.

## 2020-11-06 ENCOUNTER — HOSPITAL ENCOUNTER (OUTPATIENT)
Age: 25
Discharge: HOME | End: 2020-11-06
Payer: COMMERCIAL

## 2020-11-06 ENCOUNTER — HOSPITAL ENCOUNTER (OUTPATIENT)
Dept: ULTRASOUND IMAGING | Age: 25
Discharge: HOME | End: 2020-11-06
Payer: COMMERCIAL

## 2020-11-06 LAB
A/G RATIO: 1.7 (ref 1.1–2.2)
ALBUMIN SERPL-MCNC: 4.2 G/DL (ref 3.4–5)
ALP BLD-CCNC: 71 U/L (ref 40–129)
ALT SERPL-CCNC: 14 U/L (ref 10–40)
ANION GAP SERPL CALCULATED.3IONS-SCNC: 9 MMOL/L (ref 3–16)
AST SERPL-CCNC: 16 U/L (ref 15–37)
BILIRUB SERPL-MCNC: 0.3 MG/DL (ref 0–1)
BILIRUBIN URINE: NEGATIVE
BLOOD, URINE: ABNORMAL
BUN BLDV-MCNC: 12 MG/DL (ref 7–20)
CALCIUM SERPL-MCNC: 9.2 MG/DL (ref 8.3–10.6)
CHLORIDE BLD-SCNC: 99 MMOL/L (ref 99–110)
CHOLESTEROL, TOTAL: 200 MG/DL (ref 0–199)
CLARITY: CLEAR
CO2: 26 MMOL/L (ref 21–32)
COLOR: YELLOW
CREAT SERPL-MCNC: 0.6 MG/DL (ref 0.6–1.1)
EPITHELIAL CELLS, UA: 1 /HPF (ref 0–5)
GFR AFRICAN AMERICAN: >60
GFR NON-AFRICAN AMERICAN: >60
GLOBULIN: 2.5 G/DL
GLUCOSE BLD-MCNC: 83 MG/DL (ref 70–99)
GLUCOSE URINE: NEGATIVE MG/DL
HDLC SERPL-MCNC: 56 MG/DL (ref 40–60)
HYALINE CASTS: 0 /LPF (ref 0–8)
KETONES, URINE: NEGATIVE MG/DL
LDL CHOLESTEROL CALCULATED: 126 MG/DL
LEUKOCYTE ESTERASE, URINE: NEGATIVE
MICROSCOPIC EXAMINATION: YES
NITRITE, URINE: NEGATIVE
PH UA: 6.5 (ref 5–8)
POTASSIUM SERPL-SCNC: 4.1 MMOL/L (ref 3.5–5.1)
PROTEIN UA: NEGATIVE MG/DL
RBC UA: 0 /HPF (ref 0–4)
SODIUM BLD-SCNC: 134 MMOL/L (ref 136–145)
SPECIFIC GRAVITY UA: <1.005 (ref 1–1.03)
TOTAL PROTEIN: 6.7 G/DL (ref 6.4–8.2)
TRIGL SERPL-MCNC: 91 MG/DL (ref 0–150)
URINE TYPE: ABNORMAL
UROBILINOGEN, URINE: 0.2 E.U./DL
VLDLC SERPL CALC-MCNC: 18 MG/DL
WBC UA: 2 /HPF (ref 0–5)

## 2020-11-06 PROCEDURE — 76856 US EXAM PELVIC COMPLETE: CPT

## 2020-11-06 PROCEDURE — 80061 LIPID PANEL: CPT

## 2020-11-06 PROCEDURE — 80053 COMPREHEN METABOLIC PANEL: CPT

## 2020-11-06 PROCEDURE — 36415 COLL VENOUS BLD VENIPUNCTURE: CPT

## 2020-11-06 PROCEDURE — 76830 TRANSVAGINAL US NON-OB: CPT

## 2020-11-06 PROCEDURE — 81001 URINALYSIS AUTO W/SCOPE: CPT

## 2020-12-07 RX ORDER — NORGESTIMATE AND ETHINYL ESTRADIOL 0.25-0.035
1 KIT ORAL DAILY
Qty: 28 TABLET | Refills: 12 | Status: SHIPPED | OUTPATIENT
Start: 2020-12-07 | End: 2021-07-30 | Stop reason: ALTCHOICE

## 2020-12-17 ENCOUNTER — OFFICE VISIT (OUTPATIENT)
Dept: FAMILY MEDICINE CLINIC | Age: 25
End: 2020-12-17
Payer: COMMERCIAL

## 2020-12-17 VITALS
SYSTOLIC BLOOD PRESSURE: 114 MMHG | HEIGHT: 67 IN | WEIGHT: 180.8 LBS | BODY MASS INDEX: 28.38 KG/M2 | HEART RATE: 83 BPM | OXYGEN SATURATION: 99 % | DIASTOLIC BLOOD PRESSURE: 74 MMHG | RESPIRATION RATE: 12 BRPM | TEMPERATURE: 98.2 F

## 2020-12-17 PROCEDURE — 90715 TDAP VACCINE 7 YRS/> IM: CPT | Performed by: FAMILY MEDICINE

## 2020-12-17 PROCEDURE — 90471 IMMUNIZATION ADMIN: CPT | Performed by: FAMILY MEDICINE

## 2020-12-17 PROCEDURE — 99212 OFFICE O/P EST SF 10 MIN: CPT | Performed by: FAMILY MEDICINE

## 2020-12-17 NOTE — PROGRESS NOTES
PHYSICAL EXAM  /74 (Site: Right Upper Arm, Position: Sitting, Cuff Size: Large Adult)   Pulse 83   Temp 98.2 °F (36.8 °C) (Temporal)   Resp 12   Ht 5' 7\" (1.702 m)   Wt 180 lb 12.8 oz (82 kg)   LMP 12/03/2020   SpO2 99%   BMI 28.32 kg/m²   BP Readings from Last 5 Encounters:   12/17/20 114/74   11/02/20 110/78   01/08/20 118/74   12/09/19 112/68   10/18/19 104/72     Wt Readings from Last 5 Encounters:   12/17/20 180 lb 12.8 oz (82 kg)   11/02/20 179 lb (81.2 kg)   01/08/20 181 lb (82.1 kg)   12/09/19 178 lb (80.7 kg)   10/18/19 172 lb (78 kg)      GENERAL:   · well-developed, well-nourished, alert, no distress. EYES:   · External findings: lids and lashes normal and conjunctivae and sclerae normal  · Eyes: no periorbital cellulitis. LUNGS:    · Breathing unlabored  PSYCH:    · Alert and oriented  · Normal reasoning, insight good  · Facial expressions full, mood appropriate      Assessment and Plan:      Diagnosis Orders   1. Pelvic pain     2. Abnormal ultrasound of pelvis     3. Uterine leiomyoma, unspecified location     4. Need for tetanus booster  Tdap (age 6y and older) IM (Boostrix)   not changed. Plan as above and below. INSTRUCTIONS  · Please schedule annual complete physical (30 minutes) in one year. · See GYN RE pelvic pain and fibroid.

## 2020-12-17 NOTE — PATIENT INSTRUCTIONS
INSTRUCTIONS  · Please schedule annual complete physical (30 minutes) in one year. · See GYN RE pelvic pain and fibroid.

## 2021-07-12 ENCOUNTER — OFFICE VISIT (OUTPATIENT)
Dept: ORTHOPEDIC SURGERY | Age: 26
End: 2021-07-12
Payer: COMMERCIAL

## 2021-07-12 VITALS — RESPIRATION RATE: 16 BRPM | BODY MASS INDEX: 28.25 KG/M2 | HEIGHT: 67 IN | WEIGHT: 180 LBS

## 2021-07-12 DIAGNOSIS — G56.01 CARPAL TUNNEL SYNDROME OF RIGHT WRIST: Primary | ICD-10-CM

## 2021-07-12 PROCEDURE — 99204 OFFICE O/P NEW MOD 45 MIN: CPT | Performed by: PHYSICIAN ASSISTANT

## 2021-07-12 NOTE — LETTER
[facility] SURGERY  Surgery Scheduling Form:    DEMOGRAPHICS:                                                                                                              .  Patient Name:  Gaetano Pollack  Patient :  1995   Patient SS#:  [id number]    Patient Phone:  [phone] (home)      Patient Address:  [place] [address]    PCP:  Frances Castle MD  Insurance:   Payor/Plan Subscr  Sex Relation Sub. Ins. ID Effective Group Num   1. Huan LOYOLA 1995 Female Self [id number] 3/1/20 [id number]                                   [address] [address] [facility]     DIAGNOSIS & PROCEDURE:                                                                                            .  Diagnosis:   left Cubital Tunnel Syndrome / Ulnar Nerve Entrapment AT Elbow (354.2) and  left Carpal Tunnel Syndrome    G56.02  Operation:  left  Ulnar Nerve Decompression  (at Elbow) and  left Carpal Tunnel Release  [CPT: 57003 + 39264]  Location:  [facility]  Surgeon:  Malachi Curran    SCHEDULING INFORMATION:                                                                                         .  Surgeon's Scheduling Instruction:  elective    RN Post-op Appt:  [x] Yes   [] No  Preferred Thursday:   [] Yes   [] No    Requested Date:    OR Time:  3:00 Patient Arrival Time:  1:30  OR Time Required:  20  Minutes  Anesthesia:  General  Equipment:  None  Mini C-Arm:  No   Standard C-Arm:  No  Status:  outpatient  PAT Required:  Yes  Comments:                      Glenys Garcias. Garret Sainz MD  21 8:48 AM    BILLING INFORMATION:                                                                                                    .    Procedure:       CPT Code Modifier  left  Ulnar Nerve Decompression  (at Elbow) and  left Carpal Tunnel Release    .                    Pre Operative Physician Prophylaxis Orders - SCIP Protocols      Pre-Operative Antibiotic Order:    No Known Allergies       [x]  ----  No Antibiotic Ordered       []  ----  Give the following Antibiotic within 1 hour prior to start time:         Ancef 1 gram IV if patient is less than 200 pounds    or       Ancef 2 grams IV if patient is greater than 200 pounds    or      Vancomycin 1 gram IV (over 1 hour) if patient is allergic to           PENICILLINS or CEFALOSPORINS        Sur-17                 COVID:  BRING CARD               H&P AT PCP    Procedure: left  Ulnar Nerve Decompression  (at Elbow) and  left Carpal Tunnel Release     Patient: Kalee Caraballo  :    1995    Physician Signature:     Date: 21  Time: 8:48 AM

## 2021-07-12 NOTE — PATIENT INSTRUCTIONS
Pre-Operative Instructions    1. The night before your surgery, unless otherwise instructed, do not eat any food, drink any liquids, chew gum or mints after midnight. Abstain from alcohol for 24 hours prior to surgery. 2. You will be contacted by the Hospital the working day prior to your procedure to confirm your arrival time. 3. Patients under 25years of age must have a parent or legal guardian present to sign their consent and discharge paperwork. 4. On the day of surgery,  you will be seen pre-operatively by an anesthesiologist.     5. If you are having hand surgery, it is recommended that nail polish and acrylic nails be removed prior to surgery if possible. 6. Please bring cases for glasses, contact lenses, hearing aids or dentures. They will likely be removed prior to surgery. 7. Wear casual, loose-fitting and comfortable clothing. Consider that you may have a large dressing to fit under your clothing after surgery. 9. Please do not bring valuables such as jewelry or large sums of cash to the hospital. Remove all body piercings before coming to the hospital. Aurelio Ortiz may not  wear any rings on the hand if you are having surgery on that hand, wrist or elbow. 10. Do not smoke or chew tobacco before your surgery. [address] [place] [address] and surgery facilities are smoke-free environments. Smoking is not permitted anywhere on campus. 11. Be sure to follow any additional instructions from your physician. If the above conditions are not met, your surgery may be cancelled and rescheduled for another day. Should you develop any change in your health such as fever, cough, sore throat, cold, flu, or infection, or if you have any questions regarding your Pre-admission or surgery, please contact [address] - Surgery Scheduling at [phone], Monday through Friday, 9 a.m. to 5 p.m.

## 2021-07-12 NOTE — LETTER
Symptoms  - Anesthetic and/or Medical Risks  - We have discussed the specific limitations and risks of hospital and/or office based treatment at this time due to the COVID-19 pandemic                I have been counseled about the risks of vivienne Covid-19 in the franchesca-operative and post-operative periods related to this procedure. I have been made aware that vivienne Covid-19 around the time of a surgical procedure may worsen my prognosis for recovering from the virus and lend to a higher morbidity and or mortality risk. With this knowledge, I have requested to proceed with the procedure as scheduled. 4. I have also been informed by the informing physician that there are other risks from both known and unknown causes that are attendant to the performance of any surgical procedure. I am aware that the practice of medicine and surgery is not an exact science, and that no guarantees have been made to me concerning the results of the operation and/or procedure(s). 5. I   CONSENT / REFUSE CONSENT  (strike the phrase that does not apply) to the taking of photographs before, during and/or after the operation or procedure for scientific/educational purposes. 6. I consent to the administration of anesthesia and to the use of such anesthetics as may be deemed advisable by the anesthesiologist who has been engaged by me or my physician. 7. I certify that I have read and understand the above consent to operation and/or other procedure(s); that the explanations therein referred to were made to me by the informing physician in advance of my signing this consent; that all blanks or statements requiring insertion or completion were filled in and inapplicable paragraphs, if any, were stricken before I signed; and that all questions asked by me about the operation and/or procedure(s) which I have consented to have been fully answered in a satisfactory manner. _______________________           7/12/21                              Witness     Signature Of Patient         Date        Gabby Guillermo                                                 Informing Physician                                           Signature of Informing Physician                              If patient is unable to sign or is a minor, complete one of the following:    (A)  Patient is a minor   years of age. (B)  Patient is unable to sign because: The undersigned represents that he or she is duly authorized to execute this consent for and on behalf of the above named patient. Witness               o  Parent  o  Guardian   o  Spouse       o  Other (specify)                                                          Patient Name: Fermín Valerio  Patient YOB: 1995  Dr. Cookie Hoffman' Return To Work Policy  Regarding your ability to return to work after surgery or injury, Dr. Cookie Hoffman will not state that any patient is off of work or cannot work at all. He will place you on restrictions after your surgical procedure or injury. This means that you will be allowed to return to work the day after your office visit or surgery with restrictions. Depending on the details of your particular situation, Dr. Cookie Hoffman may state that you will have either light use or no use of your hand for a specific number of weeks. It is your obligation to communicate with your employer regarding your restrictions. It is your employer's decision as to whether they will accommodate your restrictions (i.e. allow you to come to work in your restricted capacity) or to not allow you to return to work under your restrictions. Dr. Cookie Hoffman does not participate in making this decision and cannot influence your employer regarding their decision.   If you do not communicate your restrictions to your employer, or if you do not present to work as you are scheduled to, Dr. Cookie Hoffman will not provide an 'excuse' to explain your absence. A doctors note, or official forms (BWC, FMLA, etc.) will be filled out, upon request, to indicate your date of surgery and your restrictions as stated above. Dr. Juan Vail' Narcotic Policy  Patients will only be prescribed narcotics after surgical procedures or significant injury. Not all procedures cause pain great enough to require Narcotics and thus, not all patients will receive prescriptions after surgical procedures or injuries. Narcotics are never prescribed for chronic conditions. Narcotics are never prescribed for use longer than one week at a time. Refills are only granted in unusual circumstances and only at Dr. Bob Harris discretion. Patients who are receiving narcotic medication from another physician or who are under pain management contracts will not be given a prescription for narcotics for any reason. I have read the above policies and understand that by agreeing to proceed with treatment by Dr. Sigrid Mott and his team, that I am agreeing to abide by these policies.   Patient Name:  Sudha Navas    Patient Signature:  _____________________________    Rianna Quivers Date:   7/12/21

## 2021-07-12 NOTE — PROGRESS NOTES
Ms. Cassandra Hall is a 30 y.o. left handed woman  who is seen today in Hand Surgical Consultation at the request of Carmen Melton MD.    She presents today regarding Bilateral symptoms which have been present for approximately 5 years. A history of antecedent trauma or injury is Absent. She reports symptoms to include moderate numbness & tingling in the Whole Hand, with majority in the ring finger and small finger. Neurologic symptoms do Frequently awaken her from sleep. She reports mild pain located in the Medial both elbows, with retrograde radiation . Symptoms are worsening over time. Previous treatment has included conservative measures and activity modification. She does not claim relation of her symptoms to her required work activities. She has undergone electrodiagnostic testing on her left side but not on her right. She was scheduled for surgery in 2018 with Dr. Duarte Salmeron, but did not wish to proceed at that time. I have today reviewed with Cassandra Hall the clinically relevant, past medical history, medications, allergies,  family history, social history, and Review Of Systems & I have documented any details relevant to today's presenting complaints in my history above. Ms. Sarah Putnam's self-reported past medical history, medications, allergies,  family history, social history, and Review Of Systems have been scanned into the chart under the \"Media\" tab. Physical Exam:  Ms. Elpidio Putnam's most recent vitals:  Vitals  Resp: 16  Height: 5' 7\" (170.2 cm)  Weight: 180 lb (81.6 kg)    She is well nourished, oriented to person, place & time. She demonstrates appropriate mood and affect as well as normal gait and station.     Skin: Normal in appearance, Normal Color and Free of Lesions Bilaterally   Digital range of motion is Full and equal bilaterally bilaterally  Wrist range of motion is Full and equal bilaterally bilaterally  Elbow range of motion is Full and equal bilaterally bilaterally  Sensation is subjectively tingling in the Whole Hand and objectively present in the same distribution bilaterally. All other digits show normal sensation  Vascular examination reveals normal, good capillary refill and good color bilaterally  Swelling is mild about the elbow bilaterally  There is no evidence of gross joint instability bilaterally. Muscular strength is clinically appropriate bilaterally. Examination for Cubital Tunnel Syndrome bilaterally shows mild tenderness to palpation at the Medial epicondyle. The Ulnar Nerve rests behind the medial epicondyle without subluxation upon elbow flexion. Elbow flexion-compression test is Mildly Positive, and there is an active Tinnel's Sign over the Cubital Tunnel . The Ulnar Nerve innervated intrinsic musculature is not atrophied & weakened. Examination for Carpal Tunnel Syndrome shows Carpal Tunnel Compression Test to be Mildly Positive on the right & Mildly Positive on the left. The patient displays mild baseline symptoms to potentially confound the exam.  The thenar musculature is not atrophied & weakened. Examination for Stenosing Tenosynovitis demonstrates no evidence of tenderness, thickening or nodularity at the A-1 pulleys of the digits bilaterally. There no palpable triggering or any finger or thumb. Review of Electrodiagnostic Testing:  Electrodiagnostic Studies performed by another Physician outside of my practice were Personally Reviewed & Interpreted by myself today. Test performed on: 08/30/2018    NERVE CONDUCTION STUDY:      LEFT  Median Nerve: Sensory Latency: 3.9  Motor Latency: 3.3  Ulnar Nerve:  Conduction Velocity:  38    EMG:    LEFT  Normal    My Interpretation:   This study is consistent with: Mild LEFT Median Nerve Entrapment at the Carpal Tunnel and Moderate LEFT  Ulnar Nerve Entrapment at the Cubital Tunnel            Impression:  Ms. Sudha Navas is showing clinical evidence of Ulnar Nerve entrapment at the [address] [place] [address] and presents requesting further treatment. Plan: With regard to her  Right hand:     I have had a thorough discussion with Ms. Kalee Caraballo regarding the treatment options available for her initially presenting right  cubital tunnel syndrome, which is causing her significant symptoms and difficulty. I have outlined for Ms. Kalee Caraballo the risk, benefits and consequences of the various treatment modalities, including a reasonable expectation for the long term success of each. We have discussed the likelihood that further, more aggressive treatment may be required for her current presenting condition. Based upon our current discussion and a reasonable understating of the options available to her, Ms. Kalee Caraballo has selected to proceed with a conservative plan of treatment consisting of: attention to elbow positioning and pressure,  activity modification, and the judicious use of over-the-counter anti-inflammatory medications if allowed by her primary care physician. Instructions were given regarding the use of other modalities as appropriate. I have clearly explained to her that the above outlined treatment plan should not be expected to 'cure' her  cubital tunnel syndrome, but we are rather treating the symptoms with which she presents. She has understood that in order to achieve more durable relief of her symptoms and to prevent future worsening or further damage, that definitive surgical treatment would be required. Ms. Kalee Caraballo  voiced an appropriate understanding of our discussion, the options available to her, and of the expectations of her selected  treatment. I will ask Ms. Kalee Coe to undergo electrodiagnostic studies of the symptomatic right side. I will ask that she schedule a follow-up appointment with me to review the results of this study after it has been completed.     I have also discussed with Ms. Ravi Davila Macie Anna  the other treatment options available to her  for this condition. We have today selected to proceed with conservative management. She and I have agreed that if our current course of conservative treatment does not prove to be effective over the short term future, that she will schedule a follow-up appointment to discuss and select an alternate course of therapy including possibly injection or surgical treatment. With regard to her  Left hand:     I have had a thorough discussion with Ms. Ariadne Calderón regarding the treatment options available for her worsened cubital tunnel syndrome, which is causing her significant  limitations. I have outlined for Ms. Ariadne Calderón the benefits and consequences of the various treatment modalities, including the fact that surgical treatment is the only modality which is reasonably expected to provide long lasting or permanent resolution of her symptoms. Based upon our current discussion and a reasonable understating of the options available to her, Ms. Ariadne Calderón has selected to proceed with surgical Ulnar Nerve decompression at the Cubital Tunnel. I have discussed the details of the surgical procedure, the pre, franchesca and postoperative concerns and the appropriate expectations after surgery with Ms. Ariadne Calderón today. She was given the opportunity to ask questions, voiced an understanding of the procedure, and she did wish to proceed with Left Ulnar Nerve decompression at the Cubital Tunnel. I had an extensive discussion with Ms. Ariadne Calderón (and any family members present with her today) regarding the natural history, etiology, and long term consequences of this problem. We have mutually selected a surgical treatment plan  and, in my opinion, surgical intervention is indicated at this time.  I have discussed with her the potential complications, limitations, expectations, alternatives, and risks of Ulnar Nerve decompression at the [address] [place] [address]. She has had full opportunity to ask her questions. I have answered them all to her satisfaction. I feel that Ms. Jose Daniel Sherman (and any family members present with her today) do understand our discussion today and she has provided informed consent for Left Ulnar Nerve decompression at the Cubital Tunnel. I have had a thorough discussion with Ms. Jose Daniel Sherman regarding the treatment options available for her worsened carpal tunnel syndrome, which is causing her significant  limitations. I have outlined for Ms. Jose Daniel Sherman the benefits and consequences of the various treatment modalities, including the fact that surgical treatment is the only modality which is reasonably expected to provide long lasting or permanent resolution of her symptoms. Based upon our current discussion and a reasonable understating of the options available to her, Ms. Jose Daniel Sherman has selected to proceed with surgical Carpal Tunnel Release. I have discussed the details of the surgical procedure, the pre, franchesca and postoperative concerns and the appropriate expectations after surgery with Ms. Jose Daniel Sherman today. She was given the opportunity to ask questions, voiced an understanding of the procedure, and she did wish to proceed with Left Carpal Tunnel Release. I had an extensive discussion with Ms. Jose Daniel Sherman (and any family members present with her today) regarding the natural history, etiology, and long term consequences of this problem. We have mutually selected a surgical treatment plan  and, in my opinion, surgical intervention is indicated at this time. I have discussed with her the potential complications, limitations, expectations, alternatives, and risks of Carpal Tunnel Release. She has had full opportunity to ask her questions. I have answered them all to her satisfaction.  I feel that Ms. Jose Daniel Sherman (and any family members present with her today) do

## 2021-07-22 ENCOUNTER — HOSPITAL ENCOUNTER (OUTPATIENT)
Dept: NEUROLOGY | Age: 26
Discharge: HOME | End: 2021-07-22
Payer: COMMERCIAL

## 2021-07-22 PROCEDURE — 95886 MUSC TEST DONE W/N TEST COMP: CPT

## 2021-07-22 PROCEDURE — 95908 NRV CNDJ TST 3-4 STUDIES: CPT

## 2021-07-22 NOTE — PROCEDURES
Test Date:  2021    Patient: Bert Bai : 1995 Physician: Argenis Bailey DO   Sex: Female ID#:  Ref Phys: Wilmer Kramer PA-C     Patient Complaints:  Patient is a 30year-old female who presents with numbness weakness pain in the right upper extremity from elbow to hand Onset with pain over past few months     Patient History / Exam:  PMH no endocrine disease, + tonsillectomy, No spine or upper extremity surgery PE: reflexes trace, + thumb opposition weakness     NCV & EMG Findings:  Evaluation of the right median sensory nerve showed prolonged distal peak latency (3.7 ms) and decreased conduction velocity (38 m/s). All remaining nerves (as indicated in the following tables) were within normal limits. All examined muscles (as indicated in the following table) showed no evidence of electrical instability. Impression:  Study is consistent with right carpal tunnel syndrome mild severity. No evidence of an acute radiculopathy or other entrapment neuropathy.  Thank you         Argenis Bailey DO        Nerve Conduction Studies  Motor Nerve Results      Latency Amplitude F-Lat Segment Distance CV Comment   Site (ms) Norm (mV) Norm (ms)  (cm) (m/s) Norm    Right Median (APB) Motor   Wrist 4.0  < 4.2 5.4  > 5.0         Elbow 7.9 - 2.4 -  Elbow-Wrist 23 59  > 50    Right Ulnar (ADM) Motor   Wrist 2.4  < 4.2 6.0  > 3.0         Bel Elbow 6.9 - 6.0 -  Bel Elbow-Wrist 25 56  > 50    Abv Elbow 7.6 - 5.7 -  Abv Elbow-Bel Elbow 6 86  > 48      Sensory Nerve Results      Latency (Peak) Amplitude (P-P) Segment Distance CV Comment   Site (ms) Norm (µV) Norm  (cm) (m/s) Norm    Right Median Sensory   Wrist-Dig II 3.7  < 3.6 55  > 10 Wrist-Dig II 14 38  > 39    Right Ulnar Sensory   Wrist-Dig V 2.7  < 3.7 24  > 15 Wrist-Dig V 14 52  > 38        Electromyography     Side Muscle Nerve Root Ins Act Fibs Psw Amp Dur Poly Recrt Int Monika Bailey Comment   Right Deltoid Axillary C5-C6 Nml Nml Nml Nml Nml 0 Nml Nml Right Biceps Musculocut C5-C6 Nml Nml Nml Nml Nml 0 Nml Nml    Right Triceps Radial C6-C8 Nml Nml Nml Nml Nml 0 Nml Nml    Right Brachiorad Radial C5-C6 Nml Nml Nml Nml Nml 0 Nml Nml    Right Pronator Teres Median C6-C7 Nml Nml Nml Nml Nml 0 Nml Nml    Right EIP Post Interosseous,  R... C7-C8 Nml Nml Nml Nml Nml 0 Nml Nml    Right APB Median C8-T1 Nml Nml Nml Nml Nml 0 Nml Nml    Right FDI Ulnar C8-T1 Nml Nml Nml Nml Nml 0 Nml Nml    Right Cervical Paraspinal (Uppe. .. Rami C1-C3 Nml Nml Nml         Right Cervical Paraspinal (Mid) Rami C4-C6 Nml Nml Nml         Right Cervical Paraspinal (Dusty Lazcanoier. ..  Rami C7-C8 Nml Nml Nml               Electronically signed by Anahi Sheehan DO on 7/22/2021 at 9:14 AM]

## 2021-07-26 ENCOUNTER — TELEPHONE (OUTPATIENT)
Dept: ORTHOPEDIC SURGERY | Age: 26
End: 2021-07-26

## 2021-07-26 NOTE — TELEPHONE ENCOUNTER
CPT: [id number], [id number]  BODY PART: left arm  AUTHORIZATION: pending    Submitted online with [facility] 7/26/21    Per [facility] website, [address]

## 2021-07-30 ENCOUNTER — OFFICE VISIT (OUTPATIENT)
Dept: FAMILY MEDICINE CLINIC | Age: 26
End: 2021-07-30
Payer: COMMERCIAL

## 2021-07-30 ENCOUNTER — TELEPHONE (OUTPATIENT)
Dept: ORTHOPEDIC SURGERY | Age: 26
End: 2021-07-30

## 2021-07-30 VITALS
DIASTOLIC BLOOD PRESSURE: 64 MMHG | WEIGHT: 183.5 LBS | HEIGHT: 67 IN | HEART RATE: 68 BPM | BODY MASS INDEX: 28.8 KG/M2 | OXYGEN SATURATION: 99 % | RESPIRATION RATE: 14 BRPM | SYSTOLIC BLOOD PRESSURE: 122 MMHG

## 2021-07-30 DIAGNOSIS — G56.02 LEFT CARPAL TUNNEL SYNDROME: ICD-10-CM

## 2021-07-30 DIAGNOSIS — Z01.818 PREOP EXAMINATION: Primary | ICD-10-CM

## 2021-07-30 PROCEDURE — 99213 OFFICE O/P EST LOW 20 MIN: CPT | Performed by: NURSE PRACTITIONER

## 2021-07-30 ASSESSMENT — PATIENT HEALTH QUESTIONNAIRE - PHQ9
1. LITTLE INTEREST OR PLEASURE IN DOING THINGS: 0
SUM OF ALL RESPONSES TO PHQ QUESTIONS 1-9: 0
SUM OF ALL RESPONSES TO PHQ9 QUESTIONS 1 & 2: 0
SUM OF ALL RESPONSES TO PHQ QUESTIONS 1-9: 0
2. FEELING DOWN, DEPRESSED OR HOPELESS: 0
SUM OF ALL RESPONSES TO PHQ QUESTIONS 1-9: 0

## 2021-07-30 NOTE — TELEPHONE ENCOUNTER
Completed, scanned and notified Dr. Cornelius Xiong' staff regarding fmla for signature and release.

## 2021-07-30 NOTE — PROGRESS NOTES
Preoperative Consultation      Ulises Miguel  YOB: 1995    Date of Service:  7/30/2021    Vitals:    07/30/21 1029   BP: 122/64   Pulse: 68   Resp: 14   SpO2: 99%   Weight: 183 lb 8 oz (83.2 kg)   Height: 5' 7\" (1.702 m)      Wt Readings from Last 2 Encounters:   07/30/21 183 lb 8 oz (83.2 kg)   07/12/21 180 lb (81.6 kg)     BP Readings from Last 3 Encounters:   07/30/21 122/64   12/17/20 114/74   11/02/20 110/78        Chief Complaint   Patient presents with   Cecille Pierson Pre-op Exam     Patient is having left carpal tunnel release surgery on 8/17/21     No Known Allergies  Outpatient Medications Marked as Taking for the 7/30/21 encounter (Office Visit) with TONYA Wu CNP   Medication Sig Dispense Refill    naproxen (NAPROXEN) 500 MG EC tablet Take 1 tablet by mouth 2 times daily as needed for Pain 30 tablet 3    hyoscyamine (LEVBID) 375 MCG extended release tablet Take 1 tablet by mouth every 12 hours as needed for Cramping 60 tablet 5       This patient presents to the office today for a preoperative consultation at the request of surgeon, Dr. Craig Anand, who plans on performing left ulnar nerve decompression at elbow, left carpal tunnel release on August 17, 2021 at [facility].  The current problem began 10 years ago, and symptoms have been worsening with time. Reports that she is having decreased  strength with her left hand. Also having increased pain in left hand and forearm. Migraine- will take naproxen 500 mg daily PRN. This works well for her migraines. Takes about 2-3 times per month.        Planned anesthesia: General   Known anesthesia problems: None   Bleeding risk: No recent or remote history of abnormal bleeding  Personal or FH of DVT/PE: No    Patient objection to receiving blood products: No    Patient Active Problem List   Diagnosis    Migraine with aura and without status migrainosus, not intractable    Entrapment of left ulnar nerve    Chronic pain of right knee    Pelvic pain       Past Medical History:   Diagnosis Date    IBS (irritable bowel syndrome)     Migraines      Past Surgical History:   Procedure Laterality Date    TONSILLECTOMY  2008    WISDOM TOOTH EXTRACTION       Family History   Problem Relation Age of Onset    Heart Disease Maternal Grandfather      Social History     Socioeconomic History    Marital status: Single     Spouse name: Not on file    Number of children: Not on file    Years of education: Not on file    Highest education level: Not on file   Occupational History    Not on file   Tobacco Use    Smoking status: Never Smoker    Smokeless tobacco: Never Used   Vaping Use    Vaping Use: Never used   Substance and Sexual Activity    Alcohol use: Yes     Comment: very rarely    Drug use: No    Sexual activity: Not on file   Other Topics Concern    Not on file   Social History Narrative    Not on file     Social Determinants of Health     Financial Resource Strain:     Difficulty of Paying Living Expenses:    Food Insecurity:     Worried About Running Out of Food in the Last Year:     Ran Out of Food in the Last Year:    Transportation Needs:     Lack of Transportation (Medical):  Lack of Transportation (Non-Medical):    Physical Activity:     Days of Exercise per Week:     Minutes of Exercise per Session:    Stress:     Feeling of Stress :    Social Connections:     Frequency of Communication with Friends and Family:     Frequency of Social Gatherings with Friends and Family:     Attends Mosque Services:     Active Member of Clubs or Organizations:     Attends Club or Organization Meetings:     Marital Status:    Intimate Partner Violence:     Fear of Current or Ex-Partner:     Emotionally Abused:     Physically Abused:     Sexually Abused:        Review of Systems  A comprehensive review of systems was negative except for what was noted in the HPI.      Physical Exam Constitutional: She is oriented to person, place, and time. She appears well-developed and well-nourished. No distress. HENT:   Head: Normocephalic and atraumatic. Mouth/Throat: Uvula is midline, oropharynx is clear and moist and mucous membranes are normal.   Eyes: Conjunctivae and EOM are normal. Pupils are equal, round, and reactive to light. Neck: Trachea normal and normal range of motion. Neck supple. No JVD present. Carotid bruit is not present. Cardiovascular: Normal rate, regular rhythm, normal heart sounds and intact distal pulses. Exam reveals no gallop and no friction rub. No murmur heard. Pulmonary/Chest: Effort normal and breath sounds normal. No respiratory distress. She has no wheezes. She has no rales. Musculoskeletal: She exhibits no edema. Left wrist tender to palpation. Neurological: She is alert and oriented to person, place, and time. She has normal strength. No cranial nerve deficit or sensory deficit. Coordination and gait normal.   Skin: Skin is warm and dry. No rash noted. No erythema. Psychiatric: She has a normal mood and affect. Her behavior is normal.       Lab Review   Lab Results   Component Value Date     11/06/2020    K 4.1 11/06/2020    CL 99 11/06/2020    CO2 26 11/06/2020    BUN 12 11/06/2020    CREATININE 0.6 11/06/2020    GLUCOSE 83 11/06/2020    CALCIUM 9.2 11/06/2020     Lab Results   Component Value Date    WBC 7.0 07/23/2018    HGB 13.8 07/23/2018    HCT 40.6 07/23/2018    MCV 90.0 07/23/2018     07/23/2018           Assessment:       30 y.o. patient with planned surgery as above. Known risk factors for perioperative complications: None  Current medications which may produce withdrawal symptoms if withheld perioperatively: none      Plan:     1. Preoperative workup as follows: none  2. Change in medication regimen before surgery: Discontinue NSAIDs (naproxen) 7 days before surgery  3.  Prophylaxis for cardiac events with perioperative beta-blockers: Not indicated  ACC/AHA indications for pre-operative beta-blocker use:    · Vascular surgery with history of postitive stress test  · Intermediate or high risk surgery with history of CAD   · Intermediate or high risk surgery with multiple clinical predictors of CAD- 2 of the following: history of compensated or prior heart failure, history of cerebrovascular disease, DM, or renal insufficiency    Routine administration of higher-dose, long-acting metoprolol in beta-blocker-naïve patients on the day of surgery, and in the absence of dose titration is associated with an overall increase in mortality. Beta-blockers should be started days to weeks prior to surgery and titrated to pulse < 70.  4. Deep vein thrombosis prophylaxis: regimen to be chosen by surgical team  5. No contraindications to planned surgery left ulnar nerve decompression at elbow and left carpal tunnel release with Dr. Zev Garcia on 8/17/21.

## 2021-08-06 NOTE — TELEPHONE ENCOUNTER
General Question     Subject: LOOKING FOR FMLA PAPERS. THEY CAN BE SENT VIA FAX [phone].     Patient:Margret Putnamman    Contact Number: [phone]

## 2021-08-12 NOTE — PROGRESS NOTES
Preoperative Screening for Elective Surgery/Invasive Procedures While COVID-19 present in the community     Have you tested positive or have been told to self-isolate for COVID-19 like symptoms within the past 28 days? noDo you currently have any of the following symptoms? o Fever >100.0 F or 99.9 F in immunocompromised patients? no  o New onset cough, shortness of breath or difficulty breathing? no  o New onset sore throat, myalgia (muscle aches and pains), headache, loss of taste/smell or diarrhea? no   Have you had a potential exposure to COVID-19 within the past 14 days by: no  o Close contact with a confirmed case? no  o Close contact with a healthcare worker,  or essential infrastructure worker (grocery store, [employer], gas station, public utilities or transportation)? yes-pcp  o Do you reside in a congregate setting such as; skilled nursing facility, adult home, correctional facility, homeless shelter or other institutional setting? no  o Have you had recent travel to a known COVID-19 hotspot? no    Indicate if the patient has a positive screen by answering yes to one or more of the above questions. Patients who test positive or screen positive prior to surgery or on the day of surgery should be evaluated in conjunction with the surgeon/proceduralist/anesthesiologist to determine the urgency of the procedure.

## 2021-08-12 NOTE — PROGRESS NOTES
[facility] time_1330___________        Surgery time___1505_________    Take the following medications with a sip of water: Follow your MD/Surgeons pre-procedure instructions regarding your medications    Do not eat or drink anything after 12:00 midnight prior to your surgery. This includes water chewing gum, mints and ice chips. You may brush your teeth and gargle the morning of your surgery, but do not swallow the water     Please see your family doctor/pediatrician for a history and physical and/or concerning medications. Bring any test results/reports from your physicians office. If you are under the care of a heart doctor or specialist doctor, please be aware that you may be asked to them for clearance    You may be asked to stop blood thinners such as Coumadin, Plavix, Fragmin, Lovenox, etc., or any anti-inflammatories such as:  Aspirin, Ibuprofen, Advil, Naproxen prior to your surgery. We also ask that you stop any OTC medications such as fish oil, vitamin E, glucosamine, garlic, Multivitamins, COQ 10, etc. May take tylenol    We ask that you do not smoke 24 hours prior to surgery  We ask that you do not  drink any alcoholic beverages 24 hours prior to surgery     You must make arrangements for a responsible adult to take you home after your surgery. For your safety you will not be allowed to leave alone or drive yourself home. Your surgery will be cancelled if you do not have a ride home. Also for your safety, it is strongly suggested that someone stay with you the first 24 hours after your surgery. A parent or legal guardian must accompany a child scheduled for surgery and plan to stay at the hospital until the child is discharged. Please do not bring other children with you. For your comfort, please wear simple loose fitting clothing to the hospital.  Please do not bring valuables.     Do not wear any make-up or nail polish on your fingers or toes      For your safety, please do not wear any jewelry or body piercing's on the day of surgery. All jewelry must be removed. If you have dentures, they will be removed before going to operating room. For your convenience, we will provide you with a container. If you wear contact lenses or glasses, they will be removed, please bring a case for them. If you have a living will and a durable power of  for healthcare, please bring in a copy. As part of our patient safety program to minimize surgical site infections, we ask you to do the following:    · Please notify your surgeon if you develop any illness between         now and the  day of your surgery. · This includes a cough, cold, fever, sore throat, nausea,         or vomiting, and diarrhea, etc.  ·  Please notify your surgeon if you experience dizziness, shortness         of breath or blurred vision between now and the time of your surgery. Do not shave your operative site 96 hours prior to surgery. For face and neck surgery, men may use an electric razor 48 hours   prior to surgery. You may shower the night before surgery or the morning of   your surgery with an antibacterial soap. You will need to bring a photo ID and insurance card    [facility] has an onsite pharmacy, would you like to utilize our pharmacy     If you will be staying overnight and use a C-pap machine, please bring   your C-pap to hospital     Our goal is to provide you with excellent care, therefore, visitors will be limited to two(2) in the room at a time so that we may focus on providing this care for you. Please contact pre-admission testing if you have any further questions. [facility] phone number:  [phone] PAT fax number:  [phone]  Please note these are generalized instructions for all surgical cases, you may be provided with more specific instructions according to your surgery.     SAFETY FIRST. .call before you fall

## 2021-08-16 ENCOUNTER — ANESTHESIA EVENT (OUTPATIENT)
Dept: OPERATING ROOM | Age: 26
End: 2021-08-16
Payer: COMMERCIAL

## 2021-08-17 ENCOUNTER — ANESTHESIA (OUTPATIENT)
Dept: OPERATING ROOM | Age: 26
End: 2021-08-17
Payer: COMMERCIAL

## 2021-08-17 ENCOUNTER — HOSPITAL ENCOUNTER (OUTPATIENT)
Age: 26
Setting detail: OUTPATIENT SURGERY
Discharge: HOME | End: 2021-08-17
Attending: ORTHOPAEDIC SURGERY | Admitting: ORTHOPAEDIC SURGERY
Payer: COMMERCIAL

## 2021-08-17 VITALS
RESPIRATION RATE: 16 BRPM | BODY MASS INDEX: 28.25 KG/M2 | HEIGHT: 67 IN | SYSTOLIC BLOOD PRESSURE: 121 MMHG | OXYGEN SATURATION: 97 % | TEMPERATURE: 98 F | WEIGHT: 180 LBS | DIASTOLIC BLOOD PRESSURE: 85 MMHG | HEART RATE: 57 BPM

## 2021-08-17 VITALS
SYSTOLIC BLOOD PRESSURE: 88 MMHG | RESPIRATION RATE: 4 BRPM | DIASTOLIC BLOOD PRESSURE: 54 MMHG | OXYGEN SATURATION: 100 % | TEMPERATURE: 96.8 F

## 2021-08-17 LAB — PREGNANCY, URINE: NEGATIVE

## 2021-08-17 PROCEDURE — 7100000001 HC PACU RECOVERY - ADDTL 15 MIN: Performed by: ORTHOPAEDIC SURGERY

## 2021-08-17 PROCEDURE — 3700000000 HC ANESTHESIA ATTENDED CARE: Performed by: ORTHOPAEDIC SURGERY

## 2021-08-17 PROCEDURE — 3600000015 HC SURGERY LEVEL 5 ADDTL 15MIN: Performed by: ORTHOPAEDIC SURGERY

## 2021-08-17 PROCEDURE — 2580000003 HC RX 258: Performed by: ORTHOPAEDIC SURGERY

## 2021-08-17 PROCEDURE — 64721 CARPAL TUNNEL SURGERY: CPT | Performed by: ORTHOPAEDIC SURGERY

## 2021-08-17 PROCEDURE — 64718 REVISE ULNAR NERVE AT ELBOW: CPT | Performed by: ORTHOPAEDIC SURGERY

## 2021-08-17 PROCEDURE — 2580000003 HC RX 258: Performed by: ANESTHESIOLOGY

## 2021-08-17 PROCEDURE — 84703 CHORIONIC GONADOTROPIN ASSAY: CPT

## 2021-08-17 PROCEDURE — 7100000000 HC PACU RECOVERY - FIRST 15 MIN: Performed by: ORTHOPAEDIC SURGERY

## 2021-08-17 PROCEDURE — 7100000011 HC PHASE II RECOVERY - ADDTL 15 MIN: Performed by: ORTHOPAEDIC SURGERY

## 2021-08-17 PROCEDURE — 2500000003 HC RX 250 WO HCPCS: Performed by: NURSE ANESTHETIST, CERTIFIED REGISTERED

## 2021-08-17 PROCEDURE — 6360000002 HC RX W HCPCS: Performed by: NURSE ANESTHETIST, CERTIFIED REGISTERED

## 2021-08-17 PROCEDURE — 3700000001 HC ADD 15 MINUTES (ANESTHESIA): Performed by: ORTHOPAEDIC SURGERY

## 2021-08-17 PROCEDURE — 7100000010 HC PHASE II RECOVERY - FIRST 15 MIN: Performed by: ORTHOPAEDIC SURGERY

## 2021-08-17 PROCEDURE — 2709999900 HC NON-CHARGEABLE SUPPLY: Performed by: ORTHOPAEDIC SURGERY

## 2021-08-17 PROCEDURE — 2500000003 HC RX 250 WO HCPCS: Performed by: ORTHOPAEDIC SURGERY

## 2021-08-17 PROCEDURE — 3600000005 HC SURGERY LEVEL 5 BASE: Performed by: ORTHOPAEDIC SURGERY

## 2021-08-17 RX ORDER — SODIUM CHLORIDE 9 MG/ML
25 INJECTION, SOLUTION INTRAVENOUS PRN
Status: DISCONTINUED | OUTPATIENT
Start: 2021-08-17 | End: 2021-08-17 | Stop reason: HOSPADM

## 2021-08-17 RX ORDER — SODIUM CHLORIDE 0.9 % (FLUSH) 0.9 %
10 SYRINGE (ML) INJECTION EVERY 12 HOURS SCHEDULED
Status: DISCONTINUED | OUTPATIENT
Start: 2021-08-17 | End: 2021-08-17 | Stop reason: HOSPADM

## 2021-08-17 RX ORDER — FENTANYL CITRATE 50 UG/ML
25 INJECTION, SOLUTION INTRAMUSCULAR; INTRAVENOUS EVERY 5 MIN PRN
Status: DISCONTINUED | OUTPATIENT
Start: 2021-08-17 | End: 2021-08-17 | Stop reason: HOSPADM

## 2021-08-17 RX ORDER — SODIUM CHLORIDE 0.9 % (FLUSH) 0.9 %
10 SYRINGE (ML) INJECTION PRN
Status: DISCONTINUED | OUTPATIENT
Start: 2021-08-17 | End: 2021-08-17 | Stop reason: HOSPADM

## 2021-08-17 RX ORDER — PROPOFOL 10 MG/ML
INJECTION, EMULSION INTRAVENOUS PRN
Status: DISCONTINUED | OUTPATIENT
Start: 2021-08-17 | End: 2021-08-17 | Stop reason: SDUPTHER

## 2021-08-17 RX ORDER — MEPERIDINE HYDROCHLORIDE 25 MG/ML
12.5 INJECTION INTRAMUSCULAR; INTRAVENOUS; SUBCUTANEOUS EVERY 5 MIN PRN
Status: DISCONTINUED | OUTPATIENT
Start: 2021-08-17 | End: 2021-08-17 | Stop reason: HOSPADM

## 2021-08-17 RX ORDER — LABETALOL HYDROCHLORIDE 5 MG/ML
5 INJECTION, SOLUTION INTRAVENOUS EVERY 10 MIN PRN
Status: DISCONTINUED | OUTPATIENT
Start: 2021-08-17 | End: 2021-08-17 | Stop reason: HOSPADM

## 2021-08-17 RX ORDER — FENTANYL CITRATE 50 UG/ML
50 INJECTION, SOLUTION INTRAMUSCULAR; INTRAVENOUS EVERY 5 MIN PRN
Status: DISCONTINUED | OUTPATIENT
Start: 2021-08-17 | End: 2021-08-17 | Stop reason: HOSPADM

## 2021-08-17 RX ORDER — ONDANSETRON 2 MG/ML
INJECTION INTRAMUSCULAR; INTRAVENOUS PRN
Status: DISCONTINUED | OUTPATIENT
Start: 2021-08-17 | End: 2021-08-17 | Stop reason: SDUPTHER

## 2021-08-17 RX ORDER — DIPHENHYDRAMINE HYDROCHLORIDE 50 MG/ML
12.5 INJECTION INTRAMUSCULAR; INTRAVENOUS
Status: DISCONTINUED | OUTPATIENT
Start: 2021-08-17 | End: 2021-08-17 | Stop reason: HOSPADM

## 2021-08-17 RX ORDER — BUPIVACAINE HYDROCHLORIDE 5 MG/ML
INJECTION, SOLUTION EPIDURAL; INTRACAUDAL
Status: COMPLETED | OUTPATIENT
Start: 2021-08-17 | End: 2021-08-17

## 2021-08-17 RX ORDER — DEXAMETHASONE SODIUM PHOSPHATE 4 MG/ML
INJECTION, SOLUTION INTRA-ARTICULAR; INTRALESIONAL; INTRAMUSCULAR; INTRAVENOUS; SOFT TISSUE PRN
Status: DISCONTINUED | OUTPATIENT
Start: 2021-08-17 | End: 2021-08-17 | Stop reason: SDUPTHER

## 2021-08-17 RX ORDER — ONDANSETRON 2 MG/ML
4 INJECTION INTRAMUSCULAR; INTRAVENOUS
Status: DISCONTINUED | OUTPATIENT
Start: 2021-08-17 | End: 2021-08-17 | Stop reason: HOSPADM

## 2021-08-17 RX ORDER — FENTANYL CITRATE 50 UG/ML
INJECTION, SOLUTION INTRAMUSCULAR; INTRAVENOUS PRN
Status: DISCONTINUED | OUTPATIENT
Start: 2021-08-17 | End: 2021-08-17 | Stop reason: SDUPTHER

## 2021-08-17 RX ORDER — SODIUM CHLORIDE 9 MG/ML
INJECTION, SOLUTION INTRAVENOUS CONTINUOUS
Status: DISCONTINUED | OUTPATIENT
Start: 2021-08-17 | End: 2021-08-17 | Stop reason: HOSPADM

## 2021-08-17 RX ORDER — LIDOCAINE HYDROCHLORIDE 20 MG/ML
INJECTION, SOLUTION EPIDURAL; INFILTRATION; INTRACAUDAL; PERINEURAL PRN
Status: DISCONTINUED | OUTPATIENT
Start: 2021-08-17 | End: 2021-08-17 | Stop reason: SDUPTHER

## 2021-08-17 RX ORDER — OXYCODONE HYDROCHLORIDE AND ACETAMINOPHEN 5; 325 MG/1; MG/1
1 TABLET ORAL
Status: DISCONTINUED | OUTPATIENT
Start: 2021-08-17 | End: 2021-08-17 | Stop reason: HOSPADM

## 2021-08-17 RX ORDER — MAGNESIUM HYDROXIDE 1200 MG/15ML
LIQUID ORAL CONTINUOUS PRN
Status: COMPLETED | OUTPATIENT
Start: 2021-08-17 | End: 2021-08-17

## 2021-08-17 RX ORDER — PROMETHAZINE HYDROCHLORIDE 25 MG/ML
6.25 INJECTION, SOLUTION INTRAMUSCULAR; INTRAVENOUS
Status: DISCONTINUED | OUTPATIENT
Start: 2021-08-17 | End: 2021-08-17 | Stop reason: HOSPADM

## 2021-08-17 RX ADMIN — ONDANSETRON 4 MG: 2 INJECTION INTRAMUSCULAR; INTRAVENOUS at 11:41

## 2021-08-17 RX ADMIN — FENTANYL CITRATE 100 MCG: 50 INJECTION INTRAMUSCULAR; INTRAVENOUS at 11:36

## 2021-08-17 RX ADMIN — LIDOCAINE HYDROCHLORIDE 60 MG: 20 INJECTION, SOLUTION EPIDURAL; INFILTRATION; INTRACAUDAL; PERINEURAL at 11:36

## 2021-08-17 RX ADMIN — PROPOFOL 200 MG: 10 INJECTION, EMULSION INTRAVENOUS at 11:36

## 2021-08-17 RX ADMIN — DEXAMETHASONE SODIUM PHOSPHATE 8 MG: 4 INJECTION, SOLUTION INTRAMUSCULAR; INTRAVENOUS at 11:41

## 2021-08-17 RX ADMIN — SODIUM CHLORIDE: 9 INJECTION, SOLUTION INTRAVENOUS at 10:32

## 2021-08-17 ASSESSMENT — PAIN SCALES - GENERAL
PAINLEVEL_OUTOF10: 0
PAINLEVEL_OUTOF10: 0

## 2021-08-17 ASSESSMENT — PULMONARY FUNCTION TESTS
PIF_VALUE: 3
PIF_VALUE: 2
PIF_VALUE: 10
PIF_VALUE: 3
PIF_VALUE: 2
PIF_VALUE: 0
PIF_VALUE: 3
PIF_VALUE: 3
PIF_VALUE: 0
PIF_VALUE: 2
PIF_VALUE: 2
PIF_VALUE: 10
PIF_VALUE: 16
PIF_VALUE: 3
PIF_VALUE: 2
PIF_VALUE: 0
PIF_VALUE: 10
PIF_VALUE: 2
PIF_VALUE: 3
PIF_VALUE: 2

## 2021-08-17 ASSESSMENT — PAIN - FUNCTIONAL ASSESSMENT: PAIN_FUNCTIONAL_ASSESSMENT: 0-10

## 2021-08-17 ASSESSMENT — ENCOUNTER SYMPTOMS: SHORTNESS OF BREATH: 0

## 2021-08-17 NOTE — ANESTHESIA PRE PROCEDURE
Department of Anesthesiology  Preprocedure Note       Name:  Jaiden Henao   Age:  30 y.o.  :  1995                                          MRN:  [id number]         Date:  2021      Surgeon: Ysabel Zimmerman):  Nai Paz MD    Procedure: Procedure(s):  LEFT ULNAR NERVE DECOMPRESSION AT ELBOW  LEFT CARPAL TUNNEL RELEASE    Medications prior to admission:   Prior to Admission medications    Medication Sig Start Date End Date Taking? Authorizing Provider   hyoscyamine (LEVBID) 375 MCG extended release tablet Take 1 tablet by mouth every 12 hours as needed for Cramping 18  Yes Wilner Moon MD   naproxen (NAPROXEN) 500 MG EC tablet Take 1 tablet by mouth 2 times daily as needed for Pain 10/18/19   Wilner Moon MD       Current medications:    Current Facility-Administered Medications   Medication Dose Route Frequency Provider Last Rate Last Admin    0.9 % sodium chloride infusion   Intravenous Continuous Mikki Rae MD        sodium chloride flush 0.9 % injection 10 mL  10 mL Intravenous 2 times per day Mikki Rae MD        sodium chloride flush 0.9 % injection 10 mL  10 mL Intravenous PRN Mikki Rae MD        0.9 % sodium chloride infusion  25 mL Intravenous PRN Mikki Rae MD           Allergies:  No Known Allergies    Problem List:    Patient Active Problem List   Diagnosis Code    Migraine with aura and without status migrainosus, not intractable G43. 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    Chronic pain of right knee M25.561, G89.29    Pelvic pain R10.2       Past Medical History:        Diagnosis Date    IBS (irritable bowel syndrome)     Migraines     Wears glasses        Past Surgical History:        Procedure Laterality Date    TONSILLECTOMY  2008    WISDOM TOOTH EXTRACTION         Social History:    Social History     Tobacco Use    Smoking status: Never Smoker    Smokeless tobacco: Never Used   Substance Use Topics    Alcohol use: Yes     Comment: very rarely                                Counseling given: Not Answered      Vital Signs (Current):   Vitals:    08/12/21 1412 08/17/21 1012   Weight: 180 lb (81.6 kg) 180 lb (81.6 kg)   Height: 5' 7\" (1.702 m) 5' 7\" (1.702 m)                                              BP Readings from Last 3 Encounters:   07/30/21 122/64   12/17/20 114/74   11/02/20 110/78       NPO Status: Time of last liquid consumption: 2300                        Time of last solid consumption: 2300                        Date of last liquid consumption: 08/16/21                        Date of last solid food consumption: 08/16/21    BMI:   Wt Readings from Last 3 Encounters:   08/17/21 180 lb (81.6 kg)   07/30/21 183 lb 8 oz (83.2 kg)   07/12/21 180 lb (81.6 kg)     Body mass index is 28.19 kg/m². CBC:   Lab Results   Component Value Date    WBC 7.0 07/23/2018    RBC 4.51 07/23/2018    HGB 13.8 07/23/2018    HCT 40.6 07/23/2018    MCV 90.0 07/23/2018    RDW 12.9 07/23/2018     07/23/2018       CMP:   Lab Results   Component Value Date     11/06/2020    K 4.1 11/06/2020    CL 99 11/06/2020    CO2 26 11/06/2020    BUN 12 11/06/2020    CREATININE 0.6 11/06/2020    GFRAA >60 11/06/2020    AGRATIO 1.7 11/06/2020    LABGLOM >60 11/06/2020    GLUCOSE 83 11/06/2020    PROT 6.7 11/06/2020    CALCIUM 9.2 11/06/2020    BILITOT 0.3 11/06/2020    ALKPHOS 71 11/06/2020    AST 16 11/06/2020    ALT 14 11/06/2020       POC Tests: No results for input(s): POCGLU, POCNA, POCK, POCCL, POCBUN, POCHEMO, POCHCT in the last 72 hours.     Coags: No results found for: PROTIME, INR, APTT    HCG (If Applicable): No results found for: PREGTESTUR, PREGSERUM, HCG, HCGQUANT     ABGs: No results found for: PHART, PO2ART, CBC1XPD, QQB6YRI, BEART, K9XVINKC     Type & Screen (If Applicable):  No results found for: LABABO, LABRH    Drug/Infectious Status (If Applicable):  No results found for: HIV, HEPCAB    COVID-19 Screening (If Applicable): No results found for: COVID19        Anesthesia Evaluation  Patient summary reviewed no history of anesthetic complications:   Airway: Mallampati: II  TM distance: >3 FB   Neck ROM: full  Mouth opening: > = 3 FB Dental: normal exam         Pulmonary:       (-) shortness of breath                           Cardiovascular:        (-) past MI, CABG/stent and  angina                Neuro/Psych:   (+) headaches:,    (-) seizures and CVA           GI/Hepatic/Renal:        (-) liver disease and no renal disease       Endo/Other:        (-) diabetes mellitus               Abdominal:             Vascular: Other Findings:             Anesthesia Plan      general     ASA 2       Induction: intravenous. MIPS: Postoperative opioids intended and Prophylactic antiemetics administered. Anesthetic plan and risks discussed with patient. Plan discussed with CRNA.                   Sam Perea MD   8/17/2021

## 2021-08-17 NOTE — PROGRESS NOTES
Discharge instructions given to patient and , both state understanding and deny having any further questions.

## 2021-08-17 NOTE — OP NOTE
soft tissue. Careful incision allowed exposure of the ulnar nerve. The nerve was traced proximally and circumferential control of the nerve was obtained. It was dissected proximally to the level of the connection between the biceps and triceps muscles, approximately 3 cm proximal to the medial epicondyle. It was carefully mobilized from the medial intermuscular septum. It was traced distally, being completely freed along the course of the cubital tunnel, and was dissected distally to the level of the second motor branch as it split the heads of the FCU muscle. There was a tight fibrous band at the confluence of the heads of the FCU which was carefully divided. Digital palpation revealed that there were no further proximal or distal constriction about the nerve. The Ulnar Nerve was found to be stable in it's groove without tendency toward subluxation or snapping. Attention was turned to the palm. A 2 cm longitudinal incision was fashioned at the base of the palm, paralleling the longitudinal thenar crease. Dissection was carried carefully through the subcutaneous tissue identifying and protecting the neurovascular structures. The palmar fascia was incised longitudinally, exposing the transverse carpal ligament. The transverse carpal ligament was incised from its proximal to distal most extent, under direct visualization. The terminal 2 cm of antebrachial fascia was similarly incised under direct visualization. The contents of the carpal tunnel were inspected and found to be free of mass, lesion or other abnormality. Digital palpation revealed no further constriction about the median nerve. The incisions were all irrigated copiously with sterile saline for irrigation. The pneumo-tourniquet was deflated after a period of 6 minutes elevation & the fingers were immediately pink and well perfused. Hemostasis was easily obtained with direct pressure and electrocautery.   The incisions were closed in layers with interrupted sutures. The wounds were dressed with Adaptic & dry sterile dressings after local anesthetic was instilled for postoperative analgesia. Ms. Tamiko Centeno was awakened from anesthesia having tolerated the procedure without apparent complication. She was returned to the recovery room in stable condition. At the conclusion of the procedure, all needle, instrument and sponge counts were correct. Simi Rosen MD   8/17/2021, 11:57 AM

## 2021-08-17 NOTE — ANESTHESIA POSTPROCEDURE EVALUATION
Department of Anesthesiology  Postprocedure Note    Patient: Victoria Martinez  MRN: [id number]  YOB: 1995  Date of evaluation: 8/17/2021  Time:  12:30 PM     Procedure Summary     Date: 08/17/21 Room / Location: [address]    Anesthesia Start: 1134 Anesthesia Stop: 1156    Procedures:       LEFT ULNAR NERVE DECOMPRESSION AT ELBOW (Left Elbow)      LEFT CARPAL TUNNEL RELEASE (Left Wrist) Diagnosis:       Left carpal tunnel syndrome      (LEFT CUBITAL TUNNEL SYNDROME/ ULNAR NERVE ENTRAPMENT AT ELBOW AND LEFT CARPAL TUNNEL SYNDROME)    Surgeons: Dave Lockwood MD Responsible Provider: Devin Chao MD    Anesthesia Type: general ASA Status: 2          Anesthesia Type: general    Kg Phase I: Kg Score: 10    Kg Phase II:      Last vitals: Reviewed and per EMR flowsheets.        Anesthesia Post Evaluation    Patient location during evaluation: PACU  Patient participation: complete - patient participated  Level of consciousness: awake and alert  Pain score: 2  Airway patency: patent  Nausea & Vomiting: no nausea and no vomiting  Complications: no  Cardiovascular status: blood pressure returned to baseline  Respiratory status: acceptable  Hydration status: euvolemic

## 2021-08-23 ENCOUNTER — OFFICE VISIT (OUTPATIENT)
Dept: ORTHOPEDIC SURGERY | Age: 26
End: 2021-08-23
Payer: COMMERCIAL

## 2021-08-23 VITALS — HEIGHT: 67 IN | WEIGHT: 180 LBS | RESPIRATION RATE: 16 BRPM | BODY MASS INDEX: 28.25 KG/M2

## 2021-08-23 DIAGNOSIS — G56.01 CARPAL TUNNEL SYNDROME OF RIGHT WRIST: Primary | ICD-10-CM

## 2021-08-23 PROCEDURE — 99214 OFFICE O/P EST MOD 30 MIN: CPT | Performed by: PHYSICIAN ASSISTANT

## 2021-08-23 NOTE — LETTER
[facility] SURGERY  Surgery Scheduling Form:    DEMOGRAPHICS:                                                                                                              .  Patient Name:  Cordelia Sharma  Patient :  1995   Patient SS#:      Patient Phone:  [phone] (home)    Patient Address:  [address] [place] [address]    PCP:  Lizzie Yun MD  Insurance:  Payor/Plan Subscr  Sex Relation Sub. Ins. ID Effective Group Num   1. UMR - UMR  TONJA LOU* 1995 Female Self [id number] 3/1/20 [id number]                                   [address] [address] [facility]     DIAGNOSIS & PROCEDURE:                                                                                            .  Diagnosis:   right Carpal Tunnel Syndrome (354.0)  G56.01  Operation:  right Carpal Tunnel Release  [CPT: 36171]  Location:  [facility]  Surgeon:  Janet Mckenna    SCHEDULING INFORMATION:                                                                                         .  Surgeon's Scheduling Instruction:  elective    RN Post-op Appt:  [x] Yes   [] No  Preferred Thursday:   [] Yes   [] No    Requested Date:    OR Time:  3:00 Patient Arrival Time:  1:30    OR Time Required:  15  Minutes  Anesthesia:  MAC/TIVA  Equipment:  None  Mini C-Arm:  No   Standard C-Arm:  No  Status:  Outpatient                                    COVID:   BRINGING CARD  PAT Required:  Yes  Comments:                      Beka Mckeon MD  21 9:57 AM    BILLING INFORMATION:                                                                                                    .    Procedure:       CPT Code Modifier  right Carpal Tunnel Release       .                        Pre Operative Physician Prophylaxis Orders - SCIP Protocols      Pre-Operative Antibiotic Order:    No Known Allergies       [x]  ----  No Antibiotic Ordered       []  ----  Give the following Antibiotic within 1 hour prior to start time:         Ancef 1 gram IV if patient is less than 200 pounds    or       Ancef 2 grams IV if patient is greater than 200 pounds    or      Vancomycin 1 gram IV (over 1 hour) if patient is allergic to           PENICILLINS or CEFALOSPORINS     SURG   9-                     COVID:    BRINGING CARD                     H&P CHECKING WITH PCP       Procedure: right Carpal Tunnel Release      Patient: Shraddha Rueda  :    1995    Physician Signature:     Date: 21  Time: 9:57 AM

## 2021-08-23 NOTE — PROGRESS NOTES
Ms. Mike Clemens returns today in follow-up of her previously treated  right Carpal Tunnel Syndrome. She was last seen by me in July, 2021 at which time she was treated with electrodiagnostic studies were ordered. She experienced minimal relief of her initial symptoms. She  has noticed symptom persistence over the last several weeks. She returns today with worsened symptoms of right numbness in the Median Innervated digits and tingling in the Median Innervated digits, requesting further treatment. Ms. Mike Clemens returns today in follow-up of her recent left Ulnar Nerve Decompression and Carpal Tunnel Release done approximately 1 week ago. She has done well noting mild discomfort and no other reported complications. She notes pre-operative symptoms to be improved at this time. I have today reviewed with Mike Clemens the clinically relevant, past medical history, medications, allergies,  family history, social history, and Review Of Systems & I have documented any details relevant to today's presenting complaints in my history above. Ms. Fabiano Putnam's self-reported past medical history, medications, allergies,  family history, social history, and Review Of Systems have been scanned into the chart under the \"Media\" tab. Physical Exam:  Vitals  Resp: 16  Height: 5' 7\" (170.2 cm)  Weight: 180 lb (81.6 kg)  Ms. Mike Clemens appears well, she is in no apparent distress, she demonstrates appropriate mood & affect. Skin: Normal in appearance, Normal Color and Free of Lesions Bilaterally, Skin incision is healing well, without erythema, drainage or sign of infection. On the left. Digital range of motion is limited by mild swelling on the left, without limitations on the right. Wrist range of motion is without significant limitation bilaterally  There is no evidence of gross joint instability bilaterally. Sensation is improved from preoperatvely on the left. Sensation is subjectively tingling in the Median Innervated Digits on the Right, normal on the Left and objectively present in the same distribution bilaterally. Vascular examination reveals normal, good capillary refill and good color bilaterally. Swelling is mild in the Volar left wrist.  Muscular strength is clinically appropriate bilaterally. Examination for Carpal Tunnel Syndrome shows Carpal Tunnel Compression Test to be Mildly Positive on the right & Negative on the left. The patient displays mild baseline symptoms to potentially confound the exam.  The thenar musculature is not atrophied & weakened. Sensory and Motor Median Nerve function is intact. Review of Electrodiagnostic Testing:  Electrodiagnostic Studies performed by another Physician outside of my practice were Personally Reviewed & Interpreted by myself today. Test performed on: 07/22/2021    NERVE CONDUCTION STUDY:  RIGHT   Median Nerve: Sensory Latency: 3.7  Motor Latency: 4.0  Ulnar Nerve:  Conduction Velocity:  86      EMG:  RIGHT  Normal        My Interpretation: This study is consistent with: Mild RIGHT Median Nerve Entrapment at the Carpal Tunnel    Impression:  Ms. Mike Clemens is showing evidence of persistent Carpal Tunnel Syndrome after previous treatment. She requests additional treatment at this time. She is doing well after recent left Carpal Tunnel Release and ulnar nerve decompression. Plan: With regard to her  Right, Carpal Tunnel:  I have had a thorough discussion with Ms. Mike Clemens regarding the treatment options available for her worsened carpal tunnel syndrome, which is causing her significant  limitations. I have outlined for Ms. Mike Clemens the benefits and consequences of the various treatment modalities, including the fact that surgical treatment is the only modality which is reasonably expected to provide long lasting or permanent resolution of her symptoms.   Based upon our current discussion and a reasonable understating of the options available to her, Ms. Antione Jacobson has selected to proceed with surgical Carpal Tunnel Release. I have discussed the details of the surgical procedure, the pre, franchesca and postoperative concerns and the appropriate expectations after surgery with Ms. Antione Jacobson today. She was given the opportunity to ask questions, voiced an understanding of the procedure, and she did wish to proceed with Right Carpal Tunnel Release. I had an extensive discussion with Ms. Antione Jacobson (and any family members present with her today) regarding the natural history, etiology, and long term consequences of this problem. We have mutually selected a surgical treatment plan  and, in my opinion, surgical intervention is indicated at this time. I have discussed with her the potential complications, limitations, expectations, alternatives, and risks of Carpal Tunnel Release. She has had full opportunity to ask her questions. I have answered them all to her satisfaction. I feel that Ms. Antione Jacobson (and any family members present with her today) do understand our discussion today and she has provided informed consent for Right Carpal Tunnel Release. I have also discussed with Ms. Antione Jacobson the other treatment options available to her for this condition. We have today selected to proceed with Surgical treatment. She has voiced and  understanding that there are other less aggressive treatment options which are available in this situation, albeit possibly less efficacious or durable, and she is comfortable with the plan that she has chosen. Ms. Antione Jacobson has been given a full verbal list of instructions and precautions related to her present condition. I have asked her to followup with me in the office at the prescribed time.  She is also specifically requested to call or return to the office sooner if her symptoms change

## 2021-08-23 NOTE — LETTER
CONSENT TO OPERATION  AND/OR OTHER PROCEDURE(S)          PATIENT : Victoria Martinez   YOB: 1995      DATE : 8/23/21          1. I request and consent that Dr. Vivian Puente. Harshal Saldivar,  and/or his associates or assistants perform an operation and/or procedures on the above patient at  [place] [address], to treat the condition(s) which appear indicated by the diagnostic studies already performed. I have been told that in general terms the nature, purpose and reasonable expectations of the operation and/or procedure(s) are:      Right Carpal Tunnel Release      2. It has been explained to me by the informing physician that during the course of the operation and/or procedure(s) unforeseen conditions may be revealed that necessitate an extension of the original operation and/or procedure(s) or different operation and/or procedures than those set forth in Paragraph 1. I therefore authorize and request that my physician and/or his associates or assistants perform such operations and/or procedures as are necessary and desirable in the exercise of professional judgment. The authority granted under this Paragraph 2 shall extend to all conditions that require treatment and are known to my physician at the time the operation is commenced. 3. I have been made aware by the informing physician of certain risks and consequences that are associated with the operation and/or procedure(s) described in Paragraph 1, the reasonable alternative methods or treatment, the possible consequences, the possibility that the operation and/or procedure(s) may be unsuccessful and the possibility of complications.   I understand the reasonably known risks to be:      - Bleeding  - Infection  - Poor Healing  - Possible Damage to Nerve, Vessel, Tendon/Muscle or Bone  - Need for further Treatment/Surgery  - Stiffness  - Pain  - Residual or Recurrent Symptoms  - Anesthetic and/or Medical Risks  - We have discussed the specific limitations and risks of hospital and/or office based treatment at this time due to the COVID-19 pandemic                I have been counseled about the risks of vivienne Covid-19 in the franchesca-operative and post-operative periods related to this procedure. I have been made aware that vivienne Covid-19 around the time of a surgical procedure may worsen my prognosis for recovering from the virus and lend to a higher morbidity and or mortality risk. With this knowledge, I have requested to proceed with the procedure as scheduled. 4. I have also been informed by the informing physician that there are other risks from both known and unknown causes that are attendant to the performance of any surgical procedure. I am aware that the practice of medicine and surgery is not an exact science, and that no guarantees have been made to me concerning the results of the operation and/or procedure(s). 5. I   CONSENT / REFUSE CONSENT  (strike the phrase that does not apply) to the taking of photographs before, during and/or after the operation or procedure for scientific/educational purposes. 6. I consent to the administration of anesthesia and to the use of such anesthetics as may be deemed advisable by the anesthesiologist who has been engaged by me or my physician.     7. I certify that I have read and understand the above consent to operation and/or other procedure(s); that the explanations therein referred to were made to me by the informing physician in advance of my signing this consent; that all blanks or statements requiring insertion or completion were filled in and inapplicable paragraphs, if any, were stricken before I signed; and that all questions asked by me about the operation and/or procedure(s) which I have consented to have been fully answered in a satisfactory manner.                                 _______________________           8/23/21 Witness     Signature Of Patient         Date        [facility] [place]                                                 Informing Physician                                           Signature of Informing Physician                              If patient is unable to sign or is a minor, complete one of the following:    (A)  Patient is a minor   years of age. (B)  Patient is unable to sign because: The undersigned represents that he or she is duly authorized to execute this consent for and on behalf of the above named patient. Witness               o  Parent  o  Guardian   o  Spouse       o  Other (specify)                                                          Patient Name: Cassandra Hall  Patient YOB: 1995  Dr. Duarte Salmeron' Return To Work Policy  Regarding your ability to return to work after surgery or injury, Dr. Duarte Salmeron will not state that any patient is off of work or cannot work at all. He will place you on restrictions after your surgical procedure or injury. Depending on the details of your particular situation, Dr. Duarte Salmeron may state that you will have either light use or no use of your hand for a specific number of weeks. It is your obligation to communicate with your employer regarding your restrictions. It is your employer's decision as to whether they will accommodate your restrictions (i.e. allow you to come to work in your restricted capacity) or to not allow you to return to work under your restrictions. Dr. Duarte Salmeron does not participate in making this decision and cannot influence your employer regarding their decision. If you do not communicate your restrictions to your employer, or if you do not present to work as you are scheduled to, Dr. Duarte Salmeron will not provide an 'excuse' to explain your absence.   A doctors note, or official forms (BWC, FMLA, etc.) will be filled out, upon request, to indicate your date of surgery and your restrictions as stated above.  Dr. Cano President' Narcotic Policy  Patients will only be prescribed narcotics after surgical procedures or significant injury. Not all procedures cause pain great enough to require Narcotics and thus, not all patients will receive prescriptions after surgical procedures or injuries. Narcotics are never prescribed for chronic conditions. Narcotics are never prescribed for use longer than one week at a time. Refills are only granted in unusual circumstances and only at Dr. Jesus Rodriguez discretion. Patients who are receiving narcotic medication from another physician or who are under pain management contracts will not be given a prescription for narcotics for any reason. Surgery Arrival Time:  You have been advised of the START TIME for your surgery as well as the ARRIVAL TIME at which you need to arrive at the surgery facility. Please understand that there is a certain amount of preparation which takes place at the surgery facility prior to the start of your surgery. If you arrive later than your scheduled ARRIVAL TIME, it may be necessary to cancel your surgery for that day and reschedule your procedure due to lack of adequate time for pre-surgery preparations. Thank you for being on time for your arrival.    I have read the above policies and understand that by agreeing to proceed with treatment by Dr. Tacho Krause and his team, that I am agreeing to abide by these policies.   Patient Name:  Paty Hernandez    Patient Signature:  _____________________________    Nellie Turner Date:   8/23/21

## 2021-08-23 NOTE — PATIENT INSTRUCTIONS
Postoperative Instructions After Carpal Tunnel Release    Dr. Pineda . [place]        1. After bandages are removed one week from surgery, you may chose to wear a small bandage over the incision if you wish, though you do not need to. 2. Keep incision dry until sutures have fully dissolved  or it has been 14 days since your surgery. Thereafter, you may wash with mild soap and water and shower normally. 3. Once your stiches have fully disappeared & skin appears normal, you should begin gently massaging the incision with Vitamin E (may use Vitamin E lotion or contents of Vitamin E capsule). 4. Work hard on motion of the fingers and wrist, straightening each finger fully and bending each finger fully, bending wrist forward and bending wrist backwards. Do not be concerned if you experience discomfort. This will not damage the surgery. 5. You may begin using the hand as it feels comfortable beginning 12-14 days from the day of surgery. You may not feel entirely comfortable gripping or lifting heavy objects for several weeks. 6. You may expect to see some skin peel off around the incision. You may be left with a small area of pink baby skin. This is quite normal.    Thank you for choosing [facility]) Physicians for your Hand and Upper Extremity needs. If we can be of any further assistance to you, please do not hesitate to contact us.     Office Phone Number:  [phone]  or  [phone]   '

## 2021-08-26 ENCOUNTER — TELEPHONE (OUTPATIENT)
Dept: ORTHOPEDIC SURGERY | Age: 26
End: 2021-08-26

## 2021-08-26 NOTE — TELEPHONE ENCOUNTER
Auth: NPR  Date: 9/09/2021  Reference # None  Spoke with: Ashley Duff.   Type of SX: Outpatient  Location: [facility]  CPT [address]1457   SX area: Rt hand  Insurance: [facility]

## 2021-08-30 ENCOUNTER — TELEPHONE (OUTPATIENT)
Dept: ORTHOPEDIC SURGERY | Age: 26
End: 2021-08-30

## 2021-08-30 NOTE — TELEPHONE ENCOUNTER
======================================  Taken 01:04:58 pm 08/28/21 Oper. 21  Dlvrd 01:05:52 pm 08/28/21 To CMD          ALPHA-NUMERIC PAGE          Terminal No. : [id number]        Pager Number : [phone]    Message : Christian Dowd [phone]  [id number] HAD SURGERY ON 8/17 WITH WI  LLIS.  RASH WHERE BANDAGES WERE. 12 /9/1995

## 2021-08-30 NOTE — TELEPHONE ENCOUNTER
Spoke with pt. Stated drainage started Friday and she has been washing with anti-bacterial soap. Will send picture through [address]

## 2021-08-30 NOTE — TELEPHONE ENCOUNTER
General Question     Subject: PATIENT STATES SHE HAD SURGERY ON AUGUST 17, 2021 ON HER LEFT HAND. SHE IS STATING SHE IS HAVING DRAINAGE FROM THE ELBOW INCISION. SHE STATES IT HAS BECOME WORSE SINCE LAST Friday AND HAS BECOME SWOLLEN AND TIGHT.   Patient: Tyree Colon   Contact Number: [phone]

## 2021-08-31 ENCOUNTER — OFFICE VISIT (OUTPATIENT)
Dept: FAMILY MEDICINE CLINIC | Age: 26
End: 2021-08-31
Payer: COMMERCIAL

## 2021-08-31 VITALS
OXYGEN SATURATION: 99 % | HEART RATE: 73 BPM | RESPIRATION RATE: 14 BRPM | TEMPERATURE: 98.3 F | SYSTOLIC BLOOD PRESSURE: 130 MMHG | DIASTOLIC BLOOD PRESSURE: 80 MMHG | WEIGHT: 186.2 LBS | HEIGHT: 67 IN | BODY MASS INDEX: 29.22 KG/M2

## 2021-08-31 DIAGNOSIS — G56.01 RIGHT CARPAL TUNNEL SYNDROME: ICD-10-CM

## 2021-08-31 DIAGNOSIS — L25.9 CONTACT DERMATITIS, UNSPECIFIED CONTACT DERMATITIS TYPE, UNSPECIFIED TRIGGER: ICD-10-CM

## 2021-08-31 DIAGNOSIS — Z01.818 PREOP EXAMINATION: Primary | ICD-10-CM

## 2021-08-31 PROCEDURE — 99213 OFFICE O/P EST LOW 20 MIN: CPT | Performed by: NURSE PRACTITIONER

## 2021-08-31 NOTE — PROGRESS NOTES
Preoperative Consultation      Cassandra Hall  YOB: 1995    Date of Service:  8/31/2021    Vitals:    08/31/21 1347   BP: 130/80   Site: Right Upper Arm   Position: Sitting   Cuff Size: Medium Adult   Pulse: 73   Resp: 14   Temp: 98.3 °F (36.8 °C)   TempSrc: Oral   SpO2: 99%   Weight: 186 lb 3.2 oz (84.5 kg)   Height: 5' 7\" (1.702 m)      Wt Readings from Last 2 Encounters:   08/31/21 186 lb 3.2 oz (84.5 kg)   08/23/21 180 lb (81.6 kg)     BP Readings from Last 3 Encounters:   08/31/21 130/80   08/17/21 (!) 88/54   08/17/21 121/85        Chief Complaint   Patient presents with    Pre-op Exam     9/9/2021  rt carpal tunnel release. dr otoniel Hicks     No Known Allergies  Outpatient Medications Marked as Taking for the 8/31/21 encounter (Office Visit) with TONYA Luna CNP   Medication Sig Dispense Refill    naproxen (NAPROXEN) 500 MG EC tablet Take 1 tablet by mouth 2 times daily as needed for Pain 30 tablet 3    hyoscyamine (LEVBID) 375 MCG extended release tablet Take 1 tablet by mouth every 12 hours as needed for Cramping 60 tablet 5       This patient presents to the office today for a preoperative consultation at the request of surgeon, Dr. Duarte Salmeron, who plans on performing right carpal tunnel release on September 9, 2021 at [facility].  The current problem began 10 years ago, and symptoms have been worsening with time. Patient had left ulnar nerve decompression at elbow and left carpal tunnel release on 8/17/21. Reports that pain is improving. Taking ibuprofen or tylenol daily PRN pain. Developed a rash from the bandage last week. Has been taking zyrtec 10 mg daily with improvement in itching. Migraine- will take naproxen 500 mg daily PRN. This works well for her migraines. Takes about 2-3 times per month.        Planned anesthesia: MAC   Known anesthesia problems: None   Bleeding risk: No recent or remote history of abnormal bleeding  Personal or FH of DVT/PE: No    Patient objection to receiving blood products: No    Patient Active Problem List   Diagnosis    Migraine with aura and without status migrainosus, not intractable    Entrapment of left ulnar nerve    Chronic pain of right knee    Pelvic pain    Carpal tunnel syndrome       Past Medical History:   Diagnosis Date    IBS (irritable bowel syndrome)     Migraines     Wears glasses      Past Surgical History:   Procedure Laterality Date    ARM SURGERY Left 8/17/2021    LEFT ULNAR NERVE DECOMPRESSION AT ELBOW performed by David Glasgow MD at [address] Left 8/17/2021    LEFT CARPAL TUNNEL RELEASE performed by David Glasgow MD at [address]  2008    WISDOM TOOTH EXTRACTION       Family History   Problem Relation Age of Onset    No Known Problems Mother     No Known Problems Father     Heart Disease Maternal Grandfather      Social History     Socioeconomic History    Marital status:      Spouse name: Not on file    Number of children: Not on file    Years of education: Not on file    Highest education level: Not on file   Occupational History    Not on file   Tobacco Use    Smoking status: Never Smoker    Smokeless tobacco: Never Used   Vaping Use    Vaping Use: Never used   Substance and Sexual Activity    Alcohol use: Yes     Comment: very rarely    Drug use: Never    Sexual activity: Not on file   Other Topics Concern    Not on file   Social History Narrative    Not on file     Social Determinants of Health     Financial Resource Strain:     Difficulty of Paying Living Expenses:    Food Insecurity:     Worried About Running Out of Food in the Last Year:     Ran Out of Food in the Last Year:    Transportation Needs:     Lack of Transportation (Medical):      Lack of Transportation (Non-Medical):    Physical Activity:     Days of Exercise per Week:     Minutes of Exercise per Session:    Stress:     Feeling of Stress :    Social Connections:     Frequency of Communication with Friends and Family:     Frequency of Social Gatherings with Friends and Family:     Attends Jainism Services:     Active Member of Clubs or Organizations:     Attends Club or Organization Meetings:     Marital Status:    Intimate Partner Violence:     Fear of Current or Ex-Partner:     Emotionally Abused:     Physically Abused:     Sexually Abused:        Review of Systems  A comprehensive review of systems was negative except for what was noted in the HPI. Physical Exam   Constitutional: She is oriented to person, place, and time. She appears well-developed and well-nourished. No distress. HENT:   Head: Normocephalic and atraumatic. Mouth/Throat: Uvula is midline, oropharynx is clear and moist and mucous membranes are normal.   Eyes: Conjunctivae and EOM are normal. Pupils are equal, round, and reactive to light. Neck: Trachea normal and normal range of motion. Neck supple. No JVD present. Carotid bruit is not present. Cardiovascular: Normal rate, regular rhythm, normal heart sounds and intact distal pulses. Exam reveals no gallop and no friction rub. No murmur heard. Pulmonary/Chest: Effort normal and breath sounds normal. No respiratory distress. She has no wheezes. She has no rales. Musculoskeletal: She exhibits no edema. Right wrist tender to palpation. Neurological: She is alert and oriented to person, place, and time. She has normal strength. No cranial nerve deficit or sensory deficit. Coordination and gait normal.   Skin: Skin is warm and dry. Left arm with scattered erythematous rash. Incision sites are healing well without s/s of infection. Psychiatric: She has a normal mood and affect.  Her behavior is normal.       Lab Review   Lab Results   Component Value Date     11/06/2020    K 4.1 11/06/2020    CL 99 11/06/2020    CO2 26 11/06/2020    BUN 12 11/06/2020    CREATININE 0.6 11/06/2020    GLUCOSE 83 11/06/2020 CALCIUM 9.2 11/06/2020     Lab Results   Component Value Date    WBC 7.0 07/23/2018    HGB 13.8 07/23/2018    HCT 40.6 07/23/2018    MCV 90.0 07/23/2018     07/23/2018           Assessment:       30 y.o. patient with planned surgery as above. Known risk factors for perioperative complications: None  Current medications which may produce withdrawal symptoms if withheld perioperatively: none      Plan:     1. Preoperative workup as follows: none  2. Change in medication regimen before surgery: Discontinue NSAIDs (naproxen) 7 days before surgery  3. Prophylaxis for cardiac events with perioperative beta-blockers: Not indicated  ACC/AHA indications for pre-operative beta-blocker use:    · Vascular surgery with history of postitive stress test  · Intermediate or high risk surgery with history of CAD   · Intermediate or high risk surgery with multiple clinical predictors of CAD- 2 of the following: history of compensated or prior heart failure, history of cerebrovascular disease, DM, or renal insufficiency    Routine administration of higher-dose, long-acting metoprolol in beta-blocker-naïve patients on the day of surgery, and in the absence of dose titration is associated with an overall increase in mortality. Beta-blockers should be started days to weeks prior to surgery and titrated to pulse < 70.  4. Deep vein thrombosis prophylaxis: regimen to be chosen by surgical team  5. No contraindications to planned surgery of right carpal tunnel release with Dr. Myriam Mckeon on 9/9/21.   6. Contact dermatitis left arm either from bandage or from betadine prep- continue with zyrtec 10 mg daily. Can use OTC hydrocortisone cream BID PRN. Should not use around incision sites. To call if symptoms worsen or fail to improve within the week.

## 2021-09-08 ENCOUNTER — ANESTHESIA EVENT (OUTPATIENT)
Dept: OPERATING ROOM | Age: 26
End: 2021-09-08
Payer: COMMERCIAL

## 2021-09-08 NOTE — PROGRESS NOTES
C-Difficile admission screening and protocol:     * Admitted with diarrhea? n     *Prior history of C-Diff. In last 3 months?n     *Antibiotic use in the past 6-8 weeks?n     *Prior hospitalization or nursing home in the last month? [facility]. .call before you fall  [address] time____1330_        Surgery time___1500    Take the following medications with a sip of water:    Do not eat or drink anything after 12:00 midnight prior to your surgery. This includes water chewing gum, mints and ice chips. You may brush your teeth and gargle the morning of your surgery, but do not swallow the water     Please see your family doctor/pediatrician for a history and physical and/or concerning medications. Bring any test results/reports from your physicians office. If you are under the care of a heart doctor or specialist doctor, please be aware that you may be asked to them for clearance    You may be asked to stop blood thinners such as Coumadin, Plavix, Fragmin, Lovenox, etc., or any anti-inflammatories such as:  Aspirin, Ibuprofen, Advil, Naproxen prior to your surgery. We also ask that you stop any OTC medications such as fish oil, vitamin E, glucosamine, garlic, Multivitamins, COQ 10, etc.    We ask that you do not smoke 24 hours prior to surgery  We ask that you do not  drink any alcoholic beverages 24 hours prior to surgery     You must make arrangements for a responsible adult to take you home after your surgery. For your safety you will not be allowed to leave alone or drive yourself home. Your surgery will be cancelled if you do not have a ride home. Also for your safety, it is strongly suggested that someone stay with you the first 24 hours after your surgery. A parent or legal guardian must accompany a child scheduled for surgery and plan to stay at the hospital until the child is discharged.     Please do not bring other children with [phone]  Please note these are generalized instructions for all surgical cases, you may be provided with more specific instructions according to your surgery. Preoperative Screening for Elective Surgery/Invasive Procedures While COVID-19 present in the community     Have you tested positive or have been told to self-isolate for COVID-19 like symptoms within the past 28 days?  Do you currently have any of the following symptoms? o Fever >100.0 F or 99.9 F in immunocompromised patients? n  o New onset cough, shortness of breath or difficulty breathing?n  o New onset sore throat, myalgia (muscle aches and pains), headache, loss of taste/smell or diarrhea?n   Have you had a potential exposure to COVID-19 within the past 14 days by:  o Close contact with a confirmed case?n  o Close contact with a healthcare worker,  or essential infrastructure worker (grocery store, [employer], gas station, public utilities or transportation)? Y  o Do you reside in a congregate setting such as; skilled nursing facility, adult home, correctional facility, homeless shelter or other institutional setting?n  o Have you had recent travel to a known COVID-19 hotspot?n    Indicate if the patient has a positive screen by answering yes to one or more of the above questions. Patients who test positive or screen positive prior to surgery or on the day of surgery should be evaluated in conjunction with the surgeon/proceduralist/anesthesiologist to determine the urgency of the procedure.

## 2021-09-09 ENCOUNTER — HOSPITAL ENCOUNTER (OUTPATIENT)
Age: 26
Setting detail: OUTPATIENT SURGERY
Discharge: HOME | End: 2021-09-09
Attending: ORTHOPAEDIC SURGERY | Admitting: ORTHOPAEDIC SURGERY
Payer: COMMERCIAL

## 2021-09-09 ENCOUNTER — ANESTHESIA (OUTPATIENT)
Dept: OPERATING ROOM | Age: 26
End: 2021-09-09
Payer: COMMERCIAL

## 2021-09-09 VITALS
RESPIRATION RATE: 19 BRPM | OXYGEN SATURATION: 99 % | SYSTOLIC BLOOD PRESSURE: 113 MMHG | DIASTOLIC BLOOD PRESSURE: 63 MMHG

## 2021-09-09 VITALS
TEMPERATURE: 98 F | SYSTOLIC BLOOD PRESSURE: 114 MMHG | RESPIRATION RATE: 16 BRPM | OXYGEN SATURATION: 100 % | HEIGHT: 67 IN | DIASTOLIC BLOOD PRESSURE: 60 MMHG | BODY MASS INDEX: 28.25 KG/M2 | WEIGHT: 180 LBS | HEART RATE: 77 BPM

## 2021-09-09 LAB
PREGNANCY, URINE: NEGATIVE
SARS-COV-2, NAAT: NOT DETECTED

## 2021-09-09 PROCEDURE — 7100000001 HC PACU RECOVERY - ADDTL 15 MIN: Performed by: ORTHOPAEDIC SURGERY

## 2021-09-09 PROCEDURE — 3600000005 HC SURGERY LEVEL 5 BASE: Performed by: ORTHOPAEDIC SURGERY

## 2021-09-09 PROCEDURE — 7100000000 HC PACU RECOVERY - FIRST 15 MIN: Performed by: ORTHOPAEDIC SURGERY

## 2021-09-09 PROCEDURE — 2500000003 HC RX 250 WO HCPCS

## 2021-09-09 PROCEDURE — 7100000010 HC PHASE II RECOVERY - FIRST 15 MIN: Performed by: ORTHOPAEDIC SURGERY

## 2021-09-09 PROCEDURE — 87635 SARS-COV-2 COVID-19 AMP PRB: CPT

## 2021-09-09 PROCEDURE — 6370000000 HC RX 637 (ALT 250 FOR IP): Performed by: ANESTHESIOLOGY

## 2021-09-09 PROCEDURE — 3700000000 HC ANESTHESIA ATTENDED CARE: Performed by: ORTHOPAEDIC SURGERY

## 2021-09-09 PROCEDURE — 64721 CARPAL TUNNEL SURGERY: CPT | Performed by: ORTHOPAEDIC SURGERY

## 2021-09-09 PROCEDURE — 2500000003 HC RX 250 WO HCPCS: Performed by: ORTHOPAEDIC SURGERY

## 2021-09-09 PROCEDURE — 6360000002 HC RX W HCPCS

## 2021-09-09 PROCEDURE — 2709999900 HC NON-CHARGEABLE SUPPLY: Performed by: ORTHOPAEDIC SURGERY

## 2021-09-09 PROCEDURE — 2580000003 HC RX 258: Performed by: ORTHOPAEDIC SURGERY

## 2021-09-09 PROCEDURE — 2580000003 HC RX 258: Performed by: ANESTHESIOLOGY

## 2021-09-09 PROCEDURE — 3700000001 HC ADD 15 MINUTES (ANESTHESIA): Performed by: ORTHOPAEDIC SURGERY

## 2021-09-09 PROCEDURE — 84703 CHORIONIC GONADOTROPIN ASSAY: CPT

## 2021-09-09 PROCEDURE — 3600000015 HC SURGERY LEVEL 5 ADDTL 15MIN: Performed by: ORTHOPAEDIC SURGERY

## 2021-09-09 PROCEDURE — 7100000011 HC PHASE II RECOVERY - ADDTL 15 MIN: Performed by: ORTHOPAEDIC SURGERY

## 2021-09-09 RX ORDER — MORPHINE SULFATE 2 MG/ML
2 INJECTION, SOLUTION INTRAMUSCULAR; INTRAVENOUS EVERY 5 MIN PRN
Status: DISCONTINUED | OUTPATIENT
Start: 2021-09-09 | End: 2021-09-09 | Stop reason: HOSPADM

## 2021-09-09 RX ORDER — PROPOFOL 10 MG/ML
INJECTION, EMULSION INTRAVENOUS CONTINUOUS PRN
Status: DISCONTINUED | OUTPATIENT
Start: 2021-09-09 | End: 2021-09-09 | Stop reason: SDUPTHER

## 2021-09-09 RX ORDER — MAGNESIUM HYDROXIDE 1200 MG/15ML
LIQUID ORAL CONTINUOUS PRN
Status: COMPLETED | OUTPATIENT
Start: 2021-09-09 | End: 2021-09-09

## 2021-09-09 RX ORDER — ONDANSETRON 2 MG/ML
4 INJECTION INTRAMUSCULAR; INTRAVENOUS
Status: DISCONTINUED | OUTPATIENT
Start: 2021-09-09 | End: 2021-09-09 | Stop reason: HOSPADM

## 2021-09-09 RX ORDER — HYDRALAZINE HYDROCHLORIDE 20 MG/ML
5 INJECTION INTRAMUSCULAR; INTRAVENOUS
Status: DISCONTINUED | OUTPATIENT
Start: 2021-09-09 | End: 2021-09-09 | Stop reason: HOSPADM

## 2021-09-09 RX ORDER — PROPOFOL 10 MG/ML
INJECTION, EMULSION INTRAVENOUS PRN
Status: DISCONTINUED | OUTPATIENT
Start: 2021-09-09 | End: 2021-09-09 | Stop reason: SDUPTHER

## 2021-09-09 RX ORDER — MORPHINE SULFATE 2 MG/ML
1 INJECTION, SOLUTION INTRAMUSCULAR; INTRAVENOUS EVERY 5 MIN PRN
Status: DISCONTINUED | OUTPATIENT
Start: 2021-09-09 | End: 2021-09-09 | Stop reason: HOSPADM

## 2021-09-09 RX ORDER — OXYCODONE HYDROCHLORIDE AND ACETAMINOPHEN 5; 325 MG/1; MG/1
2 TABLET ORAL PRN
Status: COMPLETED | OUTPATIENT
Start: 2021-09-09 | End: 2021-09-09

## 2021-09-09 RX ORDER — SODIUM CHLORIDE 0.9 % (FLUSH) 0.9 %
10 SYRINGE (ML) INJECTION PRN
Status: DISCONTINUED | OUTPATIENT
Start: 2021-09-09 | End: 2021-09-09 | Stop reason: HOSPADM

## 2021-09-09 RX ORDER — IBUPROFEN 400 MG/1
400 TABLET ORAL
Status: DISCONTINUED | OUTPATIENT
Start: 2021-09-09 | End: 2021-09-09 | Stop reason: HOSPADM

## 2021-09-09 RX ORDER — LABETALOL HYDROCHLORIDE 5 MG/ML
5 INJECTION, SOLUTION INTRAVENOUS EVERY 10 MIN PRN
Status: DISCONTINUED | OUTPATIENT
Start: 2021-09-09 | End: 2021-09-09 | Stop reason: HOSPADM

## 2021-09-09 RX ORDER — SODIUM CHLORIDE 9 MG/ML
25 INJECTION, SOLUTION INTRAVENOUS PRN
Status: DISCONTINUED | OUTPATIENT
Start: 2021-09-09 | End: 2021-09-09 | Stop reason: HOSPADM

## 2021-09-09 RX ORDER — MEPERIDINE HYDROCHLORIDE 25 MG/ML
12.5 INJECTION INTRAMUSCULAR; INTRAVENOUS; SUBCUTANEOUS EVERY 5 MIN PRN
Status: DISCONTINUED | OUTPATIENT
Start: 2021-09-09 | End: 2021-09-09 | Stop reason: HOSPADM

## 2021-09-09 RX ORDER — SODIUM CHLORIDE 0.9 % (FLUSH) 0.9 %
10 SYRINGE (ML) INJECTION EVERY 12 HOURS SCHEDULED
Status: DISCONTINUED | OUTPATIENT
Start: 2021-09-09 | End: 2021-09-09 | Stop reason: HOSPADM

## 2021-09-09 RX ORDER — OXYCODONE HYDROCHLORIDE AND ACETAMINOPHEN 5; 325 MG/1; MG/1
1 TABLET ORAL PRN
Status: COMPLETED | OUTPATIENT
Start: 2021-09-09 | End: 2021-09-09

## 2021-09-09 RX ORDER — SODIUM CHLORIDE 9 MG/ML
INJECTION, SOLUTION INTRAVENOUS CONTINUOUS
Status: DISCONTINUED | OUTPATIENT
Start: 2021-09-09 | End: 2021-09-09 | Stop reason: HOSPADM

## 2021-09-09 RX ORDER — LIDOCAINE HYDROCHLORIDE 20 MG/ML
INJECTION, SOLUTION EPIDURAL; INFILTRATION; INTRACAUDAL; PERINEURAL PRN
Status: DISCONTINUED | OUTPATIENT
Start: 2021-09-09 | End: 2021-09-09 | Stop reason: SDUPTHER

## 2021-09-09 RX ADMIN — PROPOFOL 50 MG: 10 INJECTION, EMULSION INTRAVENOUS at 08:14

## 2021-09-09 RX ADMIN — SODIUM CHLORIDE: 9 INJECTION, SOLUTION INTRAVENOUS at 07:48

## 2021-09-09 RX ADMIN — LIDOCAINE HYDROCHLORIDE 100 MG: 20 INJECTION, SOLUTION EPIDURAL; INFILTRATION; INTRACAUDAL; PERINEURAL at 08:11

## 2021-09-09 RX ADMIN — PROPOFOL 150 MG: 10 INJECTION, EMULSION INTRAVENOUS at 08:11

## 2021-09-09 RX ADMIN — PROPOFOL 200 MCG/KG/MIN: 10 INJECTION, EMULSION INTRAVENOUS at 08:11

## 2021-09-09 RX ADMIN — OXYCODONE HYDROCHLORIDE AND ACETAMINOPHEN 1 TABLET: 5; 325 TABLET ORAL at 09:32

## 2021-09-09 RX ADMIN — PROPOFOL 50 MG: 10 INJECTION, EMULSION INTRAVENOUS at 08:15

## 2021-09-09 ASSESSMENT — PULMONARY FUNCTION TESTS
PIF_VALUE: 0

## 2021-09-09 ASSESSMENT — PAIN SCALES - GENERAL
PAINLEVEL_OUTOF10: 3
PAINLEVEL_OUTOF10: 3
PAINLEVEL_OUTOF10: 0
PAINLEVEL_OUTOF10: 0

## 2021-09-09 ASSESSMENT — PAIN - FUNCTIONAL ASSESSMENT: PAIN_FUNCTIONAL_ASSESSMENT: 0-10

## 2021-09-09 ASSESSMENT — ENCOUNTER SYMPTOMS: SHORTNESS OF BREATH: 0

## 2021-09-09 NOTE — H&P
Pre-operative Update of H&P:    I  have seen & examined Ms. Severo Gibbon related solely to her hand and upper extremity conditions, prior to the scheduled procedure on the date of her surgery. The indications for the planned surgical procedure & and her upper-extremity condition are unchanged.

## 2021-09-09 NOTE — PROGRESS NOTES
Pt alert and oriented, vitals stable on room air. Denies pain. Full movement and sensation reported.

## 2021-09-09 NOTE — PROGRESS NOTES
8469 pt arrived from OR, vitals stable on 2L NC. Right hand dressing clean, dry, and intact. Right fingers warm, good cap refill. Pt remains sedate. Right hand elevated.

## 2021-09-09 NOTE — OP NOTE
OPERATIVE REPORT          Patient:  Ekta Farris    YOB: 1995  Date of Service:  9/9/2021   Location:  [address] OR    Preoperative Diagnosis:    Right carpal tunnel syndrome    Postoperative Diagnosis:    Same    Procedure:    Right carpal tunnel release      Surgeon:    Enzo Chavarria. Corine Huber MD    Surgical Assistant:    Annette Huerta PA-C    Anesthesia:   Local with Sedation    Blood Loss:   Minimal    Complications:  None    Tourniquet Time: 3 minutes     Indications:  Ms. Ekta Farris  is a 30y.o. year-old female with Right carpal tunnel syndrome. I have discussed preoperatively with her  the complications, limitations, expectations, alternatives and risks of surgical care, which she has understood. All of her questions have been fully answered, and she has provided written informed consent to proceed. Procedure:   After written consent was obtained and the proper operative site was identified and marked, Ms. Ekta Farris was brought to the operating room, placed in the supine position on the operating room table with the Right arm extended upon a hand table. Under an appropriate level of sedation, local anesthetic (1% Lidocaine and 1/2% Marcaine both without Epinephrine) was instilled in the planned surgical field. Her Right upper extremity was prepped and draped in the usual sterile fashion. After Esmarch exsanguination, the pneumotourniquet was inflated to 250 mm of mercury. A 2 cm longitudinal incision was fashioned at the base of the palm, paralleling the longitudinal thenar crease. Dissection was carried carefully through the subcutaneous tissue identifying and protecting the neurovascular structures. The palmar fascia was incised longitudinally, exposing the transverse carpal ligament. The transverse carpal ligament was incised from its proximal to distal most extent, under direct visualization.  The terminal 2 cm of antebrachial fascia was similarly incised under direct visualization. The contents of the carpal tunnel were inspected and found to be free of mass, lesion or other abnormality. Digital palpation revealed no further constriction about the median nerve. The wound was irrigated copiously with sterile saline for irrigation and the pneumotourniquet was deflated after a period of 3 minutes elevation. The fingers were immediately pink & well perfused. Hemostasis was easily obtained with direct pressure and electrocautery and the wound was closed with interrupted sutures. The wound was dressed with adaptic, dry sterile dressings and a bulky soft hand & wrist dressing was applied. Ms. Skylar Perez  was awakened from light sedation, having tolerated the procedure without apparent complication. She  was returned to the recovery room in stable condition. At the conclusion of the procedure all needle, instrument, and sponge counts were correct. Linda De La Cruz MD   9/9/2021, 8:25 AM

## 2021-09-09 NOTE — ANESTHESIA PRE PROCEDURE
Department of Anesthesiology  Preprocedure Note       Name:  Tom Matthew   Age:  30 y.o.  :  1995                                          MRN:  [id number]         Date:  2021      Surgeon: Linda Lancaster):  Michelle Dey MD    Procedure: Procedure(s):  RIGHT CARPAL TUNNEL RELEASE    Medications prior to admission:   Prior to Admission medications    Medication Sig Start Date End Date Taking? Authorizing Provider   naproxen (NAPROXEN) 500 MG EC tablet Take 1 tablet by mouth 2 times daily as needed for Pain 10/18/19   Ray Reed MD   hyoscyamine (LEVBID) 375 MCG extended release tablet Take 1 tablet by mouth every 12 hours as needed for Cramping 18   Ray Reed MD       Current medications:    No current facility-administered medications for this visit. No current outpatient medications on file. Facility-Administered Medications Ordered in Other Visits   Medication Dose Route Frequency Provider Last Rate Last Admin    0.9 % sodium chloride infusion   IntraVENous Continuous Tish Jane MD        sodium chloride flush 0.9 % injection 10 mL  10 mL IntraVENous 2 times per day Tish Jane MD        sodium chloride flush 0.9 % injection 10 mL  10 mL IntraVENous PRN Tish Jane MD        0.9 % sodium chloride infusion  25 mL IntraVENous PRN Tish Jane MD           Allergies:  No Known Allergies    Problem List:    Patient Active Problem List   Diagnosis Code    Migraine with aura and without status migrainosus, not intractable G43. 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    Chronic pain of right knee M25.561, G89.29    Pelvic pain R10.2    Carpal tunnel syndrome G56.00       Past Medical History:        Diagnosis Date    IBS (irritable bowel syndrome)     Migraines     Wears glasses        Past Surgical History:        Procedure Laterality Date    ARM SURGERY Left 2021    LEFT ULNAR NERVE DECOMPRESSION AT ELBOW performed by Michelle Dey MD at [facility] CARPAL TUNNEL RELEASE Left 8/17/2021    LEFT CARPAL TUNNEL RELEASE performed by David Glasgow MD at [address]  2008    WISDOM TOOTH EXTRACTION         Social History:    Social History     Tobacco Use    Smoking status: Never Smoker    Smokeless tobacco: Never Used   Substance Use Topics    Alcohol use: Yes     Comment: very rarely                                Counseling given: Not Answered      Vital Signs (Current): There were no vitals filed for this visit. BP Readings from Last 3 Encounters:   09/09/21 110/73   08/31/21 130/80   08/17/21 (!) 88/54       NPO Status:                                                                                 BMI:   Wt Readings from Last 3 Encounters:   09/09/21 180 lb (81.6 kg)   08/31/21 186 lb 3.2 oz (84.5 kg)   08/23/21 180 lb (81.6 kg)     There is no height or weight on file to calculate BMI.    CBC:   Lab Results   Component Value Date    WBC 7.0 07/23/2018    RBC 4.51 07/23/2018    HGB 13.8 07/23/2018    HCT 40.6 07/23/2018    MCV 90.0 07/23/2018    RDW 12.9 07/23/2018     07/23/2018       CMP:   Lab Results   Component Value Date     11/06/2020    K 4.1 11/06/2020    CL 99 11/06/2020    CO2 26 11/06/2020    BUN 12 11/06/2020    CREATININE 0.6 11/06/2020    GFRAA >60 11/06/2020    AGRATIO 1.7 11/06/2020    LABGLOM >60 11/06/2020    GLUCOSE 83 11/06/2020    PROT 6.7 11/06/2020    CALCIUM 9.2 11/06/2020    BILITOT 0.3 11/06/2020    ALKPHOS 71 11/06/2020    AST 16 11/06/2020    ALT 14 11/06/2020       POC Tests: No results for input(s): POCGLU, POCNA, POCK, POCCL, POCBUN, POCHEMO, POCHCT in the last 72 hours.     Coags: No results found for: PROTIME, INR, APTT    HCG (If Applicable):   Lab Results   Component Value Date    PREGTESTUR Negative 08/17/2021        ABGs: No results found for: PHART, PO2ART, JWI5XOY, CNY4UMY, BEART, B6EJHBWC     Type & Screen (If Applicable):  No results found for:

## 2021-09-09 NOTE — ANESTHESIA POSTPROCEDURE EVALUATION
Department of Anesthesiology  Postprocedure Note    Patient: Shraddha Rueda  MRN: [id number]  YOB: 1995  Date of evaluation: 9/9/2021  Time:  10:28 AM     Procedure Summary     Date: 09/09/21 Room / Location: [address]    Anesthesia Start: 0599 Anesthesia Stop: 3136    Procedure: RIGHT CARPAL TUNNEL RELEASE (Right Wrist) Diagnosis:       Right carpal tunnel syndrome      (RIGHT CARPAL TUNNEL SYNDROME)    Surgeons: Mesfin Cerna MD Responsible Provider: Justice Zepeda MD    Anesthesia Type: MAC ASA Status: 2          Anesthesia Type: MAC    Kg Phase I: Kg Score: 10    Kg Phase II: Kg Score: 10    Last vitals: Reviewed and per EMR flowsheets.        Anesthesia Post Evaluation    Patient location during evaluation: PACU  Level of consciousness: awake and alert  Airway patency: patent  Nausea & Vomiting: no nausea and no vomiting  Complications: no  Cardiovascular status: blood pressure returned to baseline  Respiratory status: acceptable  Hydration status: euvolemic  Comments: Postoperative Anesthesia Note    Name:    Shraddha Rueda  MRN:      [id number]    Patient Vitals in the past 12 hrs:  09/09/21 0939, BP:114/60, Temp:98 °F (36.7 °C), Temp src:Temporal, Pulse:77, Resp:16, SpO2:100 %  09/09/21 0904, BP:113/77, Temp:98.1 °F (36.7 °C), Temp src:Oral, Pulse:61, Resp:16, SpO2:100 %  09/09/21 0857, Pulse:67, Resp:12, SpO2:99 %  09/09/21 0847, BP:112/63, Pulse:72, Resp:15, SpO2:99 %  09/09/21 0841, BP:107/75, Pulse:66, Resp:13, SpO2:100 %  09/09/21 0836, BP:118/75, Pulse:73, Resp:14, SpO2:97 %  09/09/21 0832, Pulse:77, Resp:13, SpO2:96 %  09/09/21 0831, BP:121/78, Pulse:80, Resp:13, SpO2:93 %  09/09/21 0830, Pulse:81, Resp:15, SpO2:92 %  09/09/21 0829, BP:111/85, Pulse:79, Resp:13, SpO2:94 %  09/09/21 0828, Pulse:77, SpO2:97 %  09/09/21 0827, Temp:97.3 °F (36.3 °C), Temp src:Temporal  09/09/21 0742, BP:110/73, Temp:97 °F (36.1 °C), Pulse:58, Resp:18, SpO2:99 %  09/09/21 0715, Weight:180 lb (81.6 kg)     LABS:    CBC  Lab Results       Component                Value               Date/Time                  WBC                      7.0                 07/23/2018 08:24 AM        HGB                      13.8                07/23/2018 08:24 AM        HCT                      40.6                07/23/2018 08:24 AM        PLT                      279                 07/23/2018 08:24 AM   RENAL  Lab Results       Component                Value               Date/Time                  NA                       134 (L)             11/06/2020 09:39 AM        K                        4.1                 11/06/2020 09:39 AM        CL                       99                  11/06/2020 09:39 AM        CO2                      26                  11/06/2020 09:39 AM        BUN                      12                  11/06/2020 09:39 AM        CREATININE               0.6                 11/06/2020 09:39 AM        GLUCOSE                  83                  11/06/2020 09:39 AM   COAGS  No results found for: PROTIME, INR, APTT    Intake & Output:  @24HRIO@    Nausea & Vomiting:  No    Level of Consciousness:  Awake    Pain Assessment:  Adequate analgesia    Anesthesia Complications:  No apparent anesthetic complications    SUMMARY      Vital signs stable  OK to discharge from Stage I post anesthesia care.   Care transferred from Anesthesiology department on discharge from perioperative area

## 2021-09-17 ENCOUNTER — OFFICE VISIT (OUTPATIENT)
Dept: ORTHOPEDIC SURGERY | Age: 26
End: 2021-09-17

## 2021-09-17 VITALS — WEIGHT: 180 LBS | BODY MASS INDEX: 28.25 KG/M2 | HEIGHT: 67 IN

## 2021-09-17 DIAGNOSIS — G56.01 CARPAL TUNNEL SYNDROME OF RIGHT WRIST: Primary | ICD-10-CM

## 2021-09-17 PROCEDURE — 99024 POSTOP FOLLOW-UP VISIT: CPT | Performed by: PHYSICIAN ASSISTANT

## 2021-09-17 NOTE — PROGRESS NOTES
Ms. Tyler De La Vega returns today in follow-up of her recent right Carpal Tunnel Release done approximately 1 week ago. She has done well noting mild discomfort and no other reported complications. She notes pre-operative symptoms to be improved at this time. Physical Exam:  Skin incision is healing well, without erythema, drainage or sign of infection. Digital range of motion is Full and equal bilaterally. Wrist range of motion is Full and equal bilaterally. Sensation is improved from preoperatvely  Vascular examination reveals normal, good capillary refill and good color. Swelling is minimal.  Sensory and Motor Median Nerve function is intact. Impression:  Ms. Tyler De La Vega is doing well after recent right Carpal Tunnel Release. Plan:  Ms. Tyler De La Vega is instructed in work on Active & Passive range of motion of the digits, wrist, & elbow. These modalities were specifically demonstrated to her today. We discussed the appropriateness of gradual resumption of use of the operated hand and the return to normal use as comfort allows. She is given instructions regarding management of the fresh surgical incision and progressive use of desensitization and tissue massage techniques. We discussed the appropriate expectations and timeline for symptom improvement. She is provided a written patient instruction sheet titled: Postoperative Instructions After Carpal Tunnel Release. I have asked Ms. Tyler De La Vega to follow-up with me or contact me by telephone over the next 2-4 weeks if her symptoms have not fully resolved or if she has not regained full & painless return of function. She is also specifically instructed to return to the office or call for an appointment sooner if her symptoms are changing or worsening prior to that time.

## 2021-09-17 NOTE — PATIENT INSTRUCTIONS
Postoperative Instructions After Carpal Tunnel Release    Dr. Trent Guillermo        1. After bandages are removed one week from surgery, you may chose to wear a small bandage over the incision if you wish, though you do not need to. 2. Keep incision dry until sutures have fully dissolved  or it has been 14 days since your surgery. Thereafter, you may wash with mild soap and water and shower normally. 3. Once your stiches have fully disappeared & skin appears normal, you should begin gently massaging the incision with Vitamin E (may use Vitamin E lotion or contents of Vitamin E capsule). 4. Work hard on motion of the fingers and wrist, straightening each finger fully and bending each finger fully, bending wrist forward and bending wrist backwards. Do not be concerned if you experience discomfort. This will not damage the surgery. 5. You may begin using the hand as it feels comfortable beginning 12-14 days from the day of surgery. You may not feel entirely comfortable gripping or lifting heavy objects for several weeks. 6. You may expect to see some skin peel off around the incision. You may be left with a small area of pink baby skin. This is quite normal.    Thank you for choosing [facility] for your Hand and Upper Extremity needs. If we can be of any further assistance to you, please do not hesitate to contact us.     Office Phone Number:  [phone]  or  [phone]

## 2021-09-23 ENCOUNTER — TELEPHONE (OUTPATIENT)
Dept: ORTHOPEDIC SURGERY | Age: 26
End: 2021-09-23

## 2021-09-23 NOTE — TELEPHONE ENCOUNTER
General Question     Subject: Patient is calling to get a RTW note so she can go back to work.     Patient Leydi [place] Number: [phone]

## 2021-09-23 NOTE — H&P
Pre-operative Update of H&P:    I  have seen & examined Ms. Gaetano Pollack related solely to her hand and upper extremity conditions, prior to the scheduled procedure on the date of her surgery. The indications for the planned surgical procedure & and her upper-extremity condition are unchanged.
[facility]

## 2021-09-23 NOTE — LETTER
[address] [place] [address]  [address] [address]  Phone: [phone]  Fax: [phone]    Anat Bagley MD        September 24, 2021     Patient: Ekta Farris   YOB: 1995   Date of Visit: 9/17/2021       To Whom it May Concern:    Marie Hoffman was seen in my clinic on 9/17/2021. She may return to work full duty, with no restrictions on 9/27/2021. If you have any questions or concerns, please don't hesitate to call.     Sincerely,               Anat Bagley MD

## 2022-09-29 ENCOUNTER — OFFICE VISIT (OUTPATIENT)
Dept: FAMILY MEDICINE CLINIC | Age: 27
End: 2022-09-29
Payer: COMMERCIAL

## 2022-09-29 VITALS
HEART RATE: 64 BPM | SYSTOLIC BLOOD PRESSURE: 116 MMHG | WEIGHT: 200 LBS | BODY MASS INDEX: 31.39 KG/M2 | DIASTOLIC BLOOD PRESSURE: 80 MMHG | HEIGHT: 67 IN | OXYGEN SATURATION: 98 %

## 2022-09-29 DIAGNOSIS — Z13.220 SCREENING CHOLESTEROL LEVEL: ICD-10-CM

## 2022-09-29 DIAGNOSIS — F39 MOOD DISORDER (HCC): ICD-10-CM

## 2022-09-29 DIAGNOSIS — Z13.1 SCREENING FOR DIABETES MELLITUS: ICD-10-CM

## 2022-09-29 DIAGNOSIS — Z11.59 NEED FOR HEPATITIS C SCREENING TEST: ICD-10-CM

## 2022-09-29 DIAGNOSIS — Z23 NEEDS FLU SHOT: ICD-10-CM

## 2022-09-29 DIAGNOSIS — F39 MOOD DISORDER (HCC): Primary | ICD-10-CM

## 2022-09-29 PROBLEM — G89.29 CHRONIC PAIN OF RIGHT KNEE: Status: RESOLVED | Noted: 2020-11-02 | Resolved: 2022-09-29

## 2022-09-29 PROBLEM — M25.561 CHRONIC PAIN OF RIGHT KNEE: Status: RESOLVED | Noted: 2020-11-02 | Resolved: 2022-09-29

## 2022-09-29 PROBLEM — G56.22 ENTRAPMENT OF LEFT ULNAR NERVE: Status: RESOLVED | Noted: 2019-10-18 | Resolved: 2022-09-29

## 2022-09-29 PROBLEM — D25.9 UTERINE FIBROID: Status: ACTIVE | Noted: 2020-11-02

## 2022-09-29 LAB
CHOLESTEROL, TOTAL: 189 MG/DL (ref 0–199)
GLUCOSE FASTING: 80 MG/DL (ref 70–99)
HDLC SERPL-MCNC: 42 MG/DL (ref 40–60)
HEPATITIS C ANTIBODY INTERPRETATION: NORMAL
LDL CHOLESTEROL CALCULATED: 127 MG/DL
TRIGL SERPL-MCNC: 99 MG/DL (ref 0–150)
TSH SERPL DL<=0.05 MIU/L-ACNC: 1.84 UIU/ML (ref 0.27–4.2)
VLDLC SERPL CALC-MCNC: 20 MG/DL

## 2022-09-29 PROCEDURE — G8417 CALC BMI ABV UP PARAM F/U: HCPCS | Performed by: FAMILY MEDICINE

## 2022-09-29 PROCEDURE — 90471 IMMUNIZATION ADMIN: CPT | Performed by: FAMILY MEDICINE

## 2022-09-29 PROCEDURE — G8427 DOCREV CUR MEDS BY ELIG CLIN: HCPCS | Performed by: FAMILY MEDICINE

## 2022-09-29 PROCEDURE — 99214 OFFICE O/P EST MOD 30 MIN: CPT | Performed by: FAMILY MEDICINE

## 2022-09-29 PROCEDURE — 1036F TOBACCO NON-USER: CPT | Performed by: FAMILY MEDICINE

## 2022-09-29 PROCEDURE — 90674 CCIIV4 VAC NO PRSV 0.5 ML IM: CPT | Performed by: FAMILY MEDICINE

## 2022-09-29 RX ORDER — ESCITALOPRAM OXALATE 10 MG/1
10 TABLET ORAL DAILY
Qty: 30 TABLET | Refills: 2 | Status: SHIPPED | OUTPATIENT
Start: 2022-09-29

## 2022-09-29 SDOH — ECONOMIC STABILITY: FOOD INSECURITY: WITHIN THE PAST 12 MONTHS, THE FOOD YOU BOUGHT JUST DIDN'T LAST AND YOU DIDN'T HAVE MONEY TO GET MORE.: NEVER TRUE

## 2022-09-29 SDOH — ECONOMIC STABILITY: FOOD INSECURITY: WITHIN THE PAST 12 MONTHS, YOU WORRIED THAT YOUR FOOD WOULD RUN OUT BEFORE YOU GOT MONEY TO BUY MORE.: NEVER TRUE

## 2022-09-29 ASSESSMENT — PATIENT HEALTH QUESTIONNAIRE - PHQ9
SUM OF ALL RESPONSES TO PHQ QUESTIONS 1-9: 1
2. FEELING DOWN, DEPRESSED OR HOPELESS: 1
SUM OF ALL RESPONSES TO PHQ QUESTIONS 1-9: 1
SUM OF ALL RESPONSES TO PHQ QUESTIONS 1-9: 1
1. LITTLE INTEREST OR PLEASURE IN DOING THINGS: 0
SUM OF ALL RESPONSES TO PHQ9 QUESTIONS 1 & 2: 1
SUM OF ALL RESPONSES TO PHQ QUESTIONS 1-9: 1

## 2022-09-29 ASSESSMENT — SOCIAL DETERMINANTS OF HEALTH (SDOH): HOW HARD IS IT FOR YOU TO PAY FOR THE VERY BASICS LIKE FOOD, HOUSING, MEDICAL CARE, AND HEATING?: NOT VERY HARD

## 2022-09-29 NOTE — PATIENT INSTRUCTIONS
INSTRUCTIONS  NEXT APPOINTMENT: Please schedule check-up in 2 months     PLEASE GET BLOODWORK DRAWN TODAY ON FIRST FLOOR in 170. Take orders with you. RESULTS- most blood tests back in couple days. We will call you if any problems. If bloodwork good, you will get letter in mail or notified thru [address] (if signed up) within 2 weeks. If you do not, please call office. In October get COVID booster, new one if available OR the old one. Start lexapro one a day. Call if not feeling better in 1 month to increase dose. I've explained to her that drugs of the SSRI class can have side effects such as weight gain, sexual dysfunction, insomnia, headache, nausea. These medications are generally effective at alleviating symptoms of anxiety and/or depression. Let me know if significant side effects do occur. Patient Education      STRESS: HOW TO BETTER COPE    What causes stress? Feelings of stress are caused by the body's instinct to defend itself. This instinct is good in emergencies, such as getting out of the way of a speeding car. But stress can cause unhealthy physical symptoms if it goes on for too long, such as in response to life's daily challenges and changes. When this happens, it's as though your body gets ready to jump out of the way of the car, but you're sitting still. Your body is working overtime, with no place to put all the extra energy. This can make you feel anxious, afraid, worried and uptight. What changes may be stressful? Any sort of change can make you feel stressed, even good change. It's not just the change or event itself, but also how you react to it that matters. What's stressful is different for each person. For example, one person may feel stressed by retiring from work, while someone else may not.   Other things that may be stressful include being laid off from your job, your child leaving or returning home, the death of your spouse, divorce or marriage, an illness, an injury, a job promotion, money problems, moving, or having a baby. Can stress hurt my health? Stress can cause health problems or make health problems worse. Talk to your family doctor if you think some of your symptoms are caused by stress. It's important to make sure that your symptoms aren't caused by other health problems. Possible signs of stress  Anxiety   Back pain   Constipation or diarrhea   Depression   Fatigue   Headaches   High blood pressure   Trouble sleeping or insomnia   Problems with relationships   Shortness of breath   Stiff neck or jaw   Upset stomach   Weight gain or loss     What can I do to manage my stress? The first step is to learn to recognize when you're feeling stressed. Early warning signs of stress include tension in your shoulders and neck, or clenching your hands into fists. The next step is to choose a way to deal with your stress. One way is to avoid the event or thing that leads to your stress--but often this is not possible. A second way is to change how you react to stress. This is often the more practical way. Tips for dealing with stress  Don't worry about things you can't control, such as the weather. Solve the little problems. This can help you gain a feeling of control. Prepare to the best of your ability for events you know may be stressful, such as a job interview. Try to look at change as a positive challenge, not as a threat. Work to resolve conflicts with other people. Talk with a trusted friend, family member or counselor. Set realistic goals at home and at work. Avoid overscheduling. Exercise on a regular basis. Eat regular, well-balanced meals and get enough sleep. Meditate. Participate in something you don't find stressful, such as sports, social events or hobbies. Why is exercise useful? Exercise is a good way to deal with stress because it's a healthy way to relieve your pent-up energy and tension.  Exercise is known to release feel-good brain chemicals. It also helps you get in better shape, which makes you feel better overall. Steps to deep breathing  Lie down on a flat surface. Place a hand on your stomach, just above your navel. Place the other hand on your chest.   Breathe in slowly and try to make your stomach rise a little. Hold your breath for a second. Breathe out slowly and let your stomach go back down. What is meditation? Meditation is a form of guided thought. It can take many forms. You can do it with exercise that uses the same motions over and over, like walking or swimming. You can meditate by practicing relaxation training, by stretching or by breathing deeply. Relaxation training is simple. Start with one muscle. Hold it tight for a few seconds then relax the muscle. Do this with each of your muscles, beginning with the toes and feet and working your way up through the rest of your body, one muscle group at a time. Stretching can also help relieve tension. Roll your head in a gentle [place]. Reach toward the ceiling and bend side to side slowly. Roll your shoulders. Deep, relaxed breathing by itself may help relieve stress (see the box to the right). This helps you get plenty of oxygen and activates the relaxation response, the bodys antidote to stress. DEPRESSION    Overview   What is depression? When doctors talk about depression, they mean the medical illness called major depression. Someone who has major depression has symptoms like those listed in the Symptoms section nearly every day, all day, for 2 weeks or longer. There is also a minor form of depression that causes less severe symptoms. Both kinds of depression have the same causes and treatment. Depression causes a person to feel sad, angry or hopeless, have low self-esteem, or lose interest in things they used to enjoy. Depression can affect people of all ages and is different for every person.  A person who has depression cant control his or her feelings. If your child or teen is depressed, its not his or her fault. If you're depressed, in addition to emotional symptoms you may also have physical symptoms, like fatigue, problems with too much or too little sleep, increased or decreased appetite, headaches, other aches and pains, digestive problems and problems with sex. An older person who has depression may feel confused or have trouble understanding simple requests. Is depression more common in women than in men? Yes. Women are twice as likely as men to experience depression. The reason for this is unknown, but changes in a woman's hormone levels may be related to depression. Symptoms   What are the symptoms of depression? Feeling sad, numb or hopeless   Frequent crying spells   Losing interest or pleasure in things you used to enjoy (including sex)   Feeling guilty or worthless   Thinking about death or suicide   Sleeping too much, or having problems sleeping   Unintended weight loss or gain   Feeling very tired all the time   Having trouble paying attention, recalling things, concentrating and making decisions   Having aches and pains that don't get better with treatment   Feeling restless, irritated and easily annoyed     What are some of the signs of depression in children and teens? The signs of depression are different for every person. Infants and  children who are depressed may have a poor appetite and may lose weight. You may notice that they don't seem to enjoy playing. School-aged children who are depressed may seem less confident. They might feel like they can't do anything right. Older children and teens who are depressed may seem to stop caring about themselves or family members, may not want to go to school and, in general, may lose interest in life activities. Older children may also show signs of eating more and sleeping more, or eating less and sleeping less.   In some children, the only signs of depression may be having a headache or stomachache, not wanting to go to school or losing their temper. If you notice these signs everyday for several weeks, they might mean your child is depressed. Causes & Risk Factors   What causes depression? Depression may be caused by an imbalance of chemicals in the brain. Sometimes there aren't enough chemical messengers (called neurotransmitters) in the brain. Examples of neurotransmitters that affect your mood are serotonin (say \"sair-a-tone-in\"), norepinephrine (say \"nor-ep-in-ef-rin\") and dopamine (say dope-a-mean). The imbalance could be caused by your genes or by events in your life. Sometimes depression is a side effect of another illness. Depression can be hereditary (meaning it can run in families). Depression can be triggered by stressful events in your life, such as the death of someone you love, a divorce, chronic illness or loss of a job. Taking certain medicines, abusing drugs or alcohol or having other illnesses can also lead to depression. Depression is not caused by personal weakness, laziness or lack of willpower. Depression is not a normal part of growing older, but it is common in adults who are 72years of age and older. nursing home, health problems and the loss of loved ones are things that happen to older adults. Feeling sad at these times is normal. But if these feelings persist and keep you from your usual activities, you should talk to your doctor. Can giving birth cause depression? In the days following the birth of a baby, it is common for some mothers to have mood swings. They may feel a little depressed, have a hard time concentrating, lose their appetite or find that they can't sleep well even when the baby is asleep. This is called the baby blues and goes away within 10 days after delivery. However, some women have worse symptoms or symptoms that last longer. This is called postpartum depression. Why do young people get depressed?   The following are some of the reasons children and teens might get depressed: The family moves to a new place to live. The child has to change to a new school. A pet, friend or family member [place]. Someone in the family is very sick. The child experiences the hormonal changes of puberty. Diagnosis & Tests   How is depression diagnosed? Sometimes depression is first recognized by friends or family members. If you're having symptoms of depression, be sure to tell your doctor so you can get help. Don't expect your doctor to be able to guess that you're depressed just by looking at you. Sometimes when people are depressed, they have a hard time imagining that treatment can actually help. But, the sooner you seek treatment, the sooner the depression will lift. Once you tell your doctor how you're feeling, he or she may ask you some questions about your symptoms, about your health and about your family history. Your doctor may also give you a physical exam and do some tests. It is important to tell your doctor about any medicines that you are taking. Why is depression in older adults hard to recognize? In older adults, it can be hard to tell the difference between depression and illnesses such as dementia. Also, older adults may not talk to their doctor about their sad or anxious feelings because they are embarrassed. But depression is nothing to be embarrassed about. It is not a personal weakness. It's a medical illness that can be treated. Treatment   How is depression treated? Depression can be treated with medicines, with counseling, or with both. A nutritious diet, exercising on a regular basis and avoiding alcohol, drugs and too much caffeine can also help. What about medicines? Medicines that treat depression are called antidepressants. They help increase the number of chemical messengers (serotonin, norepinephrine, dopamine) in your brain. Antidepressants work differently for different people.  They also have different side effects. So, even if one medicine bothers you or doesn't work for you, another may help. You may notice improvement as soon as 1 week after you start taking the medicine. But you probably won't see the full effects for about 8 to 12 weeks. You may have side effects at first but they tend to decrease after a couple of weeks. Don't stop taking the medicine without checking with your doctor first.    Reasons to get help for depression  Early treatment helps keep depression from getting worse or lasting a long time. Thoughts of suicide are common in people with depression. The risk of suicide is higher if you don't get treatment for your depression. When depression is successfully treated, the thoughts of suicide will go away. Treatment can help you return to your \"normal\" self, enjoying life. Treatment can help prevent depression from coming back. What about counseling? For mild to moderate depression, counseling may be a good treatment option. For major depression and for some people with minor depression, counseling may not be enough. A combination of medicine and talk therapy is usually the most effective way of treating more severe depression. If you continue the combination treatment for at least two years you are less likely to have depression come back. In psychotherapy, you talk with a trained therapist or counselor about things that are going on in your life. The focus may be on your thoughts and beliefs, about things that happened in your past, or on your relationships. Or the focus may be on your behavior, how it's affecting you and what you can do differently. Psychotherapy usually lasts for a limited time, such as 8 to 20 visits. What should I do if I think my child is depressed? Ask your child about his or her thoughts and feelings. It may also be a good idea to talk to your doctor about your child's behavior and your concerns about his or her depression.  In most cases, taking your child to your family doctor is a good idea. A medical problem may be causing the depression. Your doctor may want to give your child a general medical check-up. What can be done to help depressed children and teens? Most depressed children and teens should talk to a counselor, therapist, psychologist or psychiatrist about what is making them feel the way they are feeling. Family counseling can help everyone in the family. Your family doctor can refer you and your child to someone for counseling. Most depressed children and teens do best when they get both counseling and medicine. What is electroconvulsive therapy? Electroconvulsive therapy (also called ECT or electroshock therapy) is a procedure used to help treat certain mental illnesses. Electric currents are passed through the brain in order to trigger a seizure (a short period of irregular brain activity), lasting about 40 seconds. Medicine is given during ECT to help prevent damage to muscles and bones. Electroconvulsive therapy may help people who have the following conditions:  Severe depression that does not respond to antidepressants (medicines used to treat depression) or counseling. Severe depression in patients who can't take antidepressants. Severe syd that does not respond to medication. Symptoms of severe syd may include agitation, confusion, hallucinations or delusions. Schizophrenia that does not respond to medication. Will I need to go to the hospital?  Depression can usually be treated through visits to your doctor. Treatment in the hospital may be needed if you have other medical conditions that could affect your treatment or if you're at high risk of suicide. How long will the depression last?  This depends on how soon you get help. Left untreated, depression can last for weeks, months or even years. The main risk in not getting treatment is suicide.  Treatment can help depression lift in 8 to 12 weeks, or less. Tips on getting through depression  Don't isolate yourself. Stay in touch with your loved ones and friends, your Jewish advisor and your family doctor. Do pace yourself. Don't expect to do everything you normally can. Set a realistic schedule. Don't believe negative thoughts you may have, such as blaming yourself or expecting to fail. This thinking is part of depression. These thoughts will go away as your depression lifts. Don't blame yourself for your depression. You didn't cause it. Do get involved in activities that make you feel good or feel like you've achieved something. Don't make major life decisions (for example, about separation or divorce). You may not be thinking clearly while you are depressed, so the decisions you make at this time may not be the best ones for you. If you must make a big decision, ask someone you trust to help you. Do avoid drugs and alcohol. Both make depression worse. Both can cause dangerous side effects with antidepressant medicines. Do exercise often to make yourself feel better. Physical activity seems to cause a chemical reaction in the body that can improve your mood. Exercising 4 to 6 times a week for at least 30 minutes each time is a good goal. But even less activity can be helpful. Do eat balanced meals and healthy food. Do get enough sleep. Do take your medicine and/or go to counseling as often as your doctor tells you to. Your medicine won't work if you only take it once in a while. Do set small goals for yourself, because you may have less energy. Do encourage yourself. Do get as much information as you can about depression and how to treat it. Do call your doctor or the local suicide crisis center right away if you start thinking about suicide. Dont get discouraged. It will take time for your depression to lift fully. Be patient with yourself. Don't give up.      Complications   Suicide  People who have depression sometimes think about suicide. This thinking is a common part of the depression. If you have thoughts about hurting yourself, tell someone. You could tell your doctor, your friends, your family, or call your local suicide hot line, such as the [address] [place] [address] at [phone]. Get help right away. There are people who can help you, and depression can be successfully treated.

## 2022-09-29 NOTE — PROGRESS NOTES
PROBLEM VISIT NOTE     Subjective:     Chief Complaint   Patient presents with    Other     Poss thyroid issue - 2 years, getting worse. Fatigue, anxiety, weight gain. Aunt just DX with Hyperthyroid. Tammy Will is a 27 y.o. female who presents with worrying about things, irritable, easily overwhelmed. Not sad or down. Sleep latency and mind racing at times. Feels tremulous and heart beating fast. Trouble focusing. Feels disconnected from things, \" by plexi glass\"  Sleep non-restorative. 20 oz coffee a day. Does not think snoring. New job x 2 months much less stressful but still hard to be happy. Handles everything and then feels things couple weeks later. Had panic attack in high school with shaking, hyperventilating, tearful, lasted 20 min. None since. Regular menses  Also, no gotten pregnant in 2 years off contraception    Health Maintenance Due   Topic Date Due    Hepatitis C screen  Never done    Flu vaccine (1) 08/01/2022     CHART REVIEW   reports that she has never smoked.  She has never used smokeless tobacco.  Health Maintenance Due   Topic Date Due    Hepatitis C screen  Never done    Flu vaccine (1) 08/01/2022     Current Outpatient Medications   Medication Instructions    escitalopram (LEXAPRO) 10 mg, Oral, DAILY    hyoscyamine (LEVBID) 0.375 mg, Oral, EVERY 12 HOURS PRN     LAST LABS  LDL Calculated   Date Value Ref Range Status   11/06/2020 126 (H) <100 mg/dL Final     Lab Results   Component Value Date    HDL 56 11/06/2020     Lab Results   Component Value Date    TRIG 91 11/06/2020     Lab Results   Component Value Date     (L) 11/06/2020    K 4.1 11/06/2020    CREATININE 0.6 11/06/2020     Lab Results   Component Value Date    WBC 7.0 07/23/2018    HGB 13.8 07/23/2018     07/23/2018     Lab Results   Component Value Date    ALT 14 11/06/2020    AST 16 11/06/2020    ALKPHOS 71 11/06/2020    BILITOT 0.3 11/06/2020     TSH (uIU/mL)   Date Value   07/23/2018 3.08 Lab Results   Component Value Date    LABA1C 5.1 07/23/2018     Objective:   PHYSICAL EXAM   /80 (Site: Left Upper Arm, Position: Sitting, Cuff Size: Medium Adult)   Pulse 64   Ht 5' 7\" (1.702 m)   Wt 200 lb (90.7 kg)   LMP 08/05/2022   SpO2 98%   BMI 31.32 kg/m²   BP Readings from Last 5 Encounters:   09/29/22 116/80   09/09/21 113/63   09/09/21 114/60   08/31/21 130/80   08/17/21 (!) 88/54     Wt Readings from Last 5 Encounters:   09/29/22 200 lb (90.7 kg)   09/17/21 180 lb (81.6 kg)   09/09/21 180 lb (81.6 kg)   08/31/21 186 lb 3.2 oz (84.5 kg)   08/23/21 180 lb (81.6 kg)      GENERAL:   well-developed, well-nourished, alert, no distress. LUNGS:    Breathing unlabored  clear to auscultation bilaterally and good air movement  CARDIOVASC:   regular rate and rhythm  SKIN: warm and dry     Assessment and Plan:      Diagnosis Orders   1. Mood disorder (HCC)  TSH    escitalopram (LEXAPRO) 10 MG tablet      2. Needs flu shot  Influenza, FLUCELVAX, (age 10 mo+), IM, PF, 0.5 mL      3. Need for hepatitis C screening test  Hepatitis C Antibody      4. Screening cholesterol level  Lipid Panel      5. Screening for diabetes mellitus  Glucose, Fasting         INSTRUCTIONS  NEXT APPOINTMENT: Please schedule check-up in 2 months     PLEASE GET BLOODWORK DRAWN TODAY ON FIRST FLOOR in 170. Take orders with you. RESULTS- most blood tests back in couple days. We will call you if any problems. If bloodwork good, you will get letter in mail or notified thru [address] (if signed up) within 2 weeks. If you do not, please call office. In October get COVID booster, new one if available OR the old one. Start lexapro one a day. Call if not feeling better in 1 month to increase dose. I've explained to her that drugs of the SSRI class can have side effects such as weight gain, sexual dysfunction, insomnia, headache, nausea.  These medications are generally effective at alleviating symptoms of anxiety and/or depression. Let me know if significant side effects do occur.

## 2022-10-03 ENCOUNTER — PATIENT MESSAGE (OUTPATIENT)
Dept: FAMILY MEDICINE CLINIC | Age: 27
End: 2022-10-03

## 2022-11-21 ENCOUNTER — OFFICE VISIT (OUTPATIENT)
Dept: FAMILY MEDICINE CLINIC | Age: 27
End: 2022-11-21
Payer: COMMERCIAL

## 2022-11-21 VITALS
HEIGHT: 67 IN | OXYGEN SATURATION: 98 % | SYSTOLIC BLOOD PRESSURE: 120 MMHG | BODY MASS INDEX: 31.23 KG/M2 | WEIGHT: 199 LBS | HEART RATE: 72 BPM | DIASTOLIC BLOOD PRESSURE: 80 MMHG

## 2022-11-21 DIAGNOSIS — G47.9 SLEEP DISORDER: ICD-10-CM

## 2022-11-21 DIAGNOSIS — F39 MOOD DISORDER (HCC): Primary | ICD-10-CM

## 2022-11-21 PROCEDURE — G8417 CALC BMI ABV UP PARAM F/U: HCPCS | Performed by: FAMILY MEDICINE

## 2022-11-21 PROCEDURE — 99214 OFFICE O/P EST MOD 30 MIN: CPT | Performed by: FAMILY MEDICINE

## 2022-11-21 PROCEDURE — G8482 FLU IMMUNIZE ORDER/ADMIN: HCPCS | Performed by: FAMILY MEDICINE

## 2022-11-21 PROCEDURE — 1036F TOBACCO NON-USER: CPT | Performed by: FAMILY MEDICINE

## 2022-11-21 PROCEDURE — G8427 DOCREV CUR MEDS BY ELIG CLIN: HCPCS | Performed by: FAMILY MEDICINE

## 2022-11-21 RX ORDER — BUPROPION HYDROCHLORIDE 150 MG/1
150 TABLET ORAL EVERY MORNING
Qty: 30 TABLET | Refills: 3 | Status: SHIPPED | OUTPATIENT
Start: 2022-11-21

## 2022-11-21 RX ORDER — ESCITALOPRAM OXALATE 5 MG/1
5 TABLET ORAL DAILY
Qty: 30 TABLET | Refills: 2 | Status: SHIPPED | OUTPATIENT
Start: 2022-11-21

## 2022-11-21 NOTE — PATIENT INSTRUCTIONS
INSTRUCTIONS  NEXT APPOINTMENT: Please schedule check-up in 2 months with Keshav Figueroa (Nurse Practitioner). See sleep specialist.  Start wellbutrin. In 1 week, decrease lexapro to half tab. Let me know how doing in 2-3 weeks.

## 2022-11-21 NOTE — PROGRESS NOTES
Mood Visit  Subjective:     Chief Complaint   Patient presents with    Depression     F/u fpr depression and anxiety refill med. Seems to be helping      Yessy Pickett is a 27 y.o. female who presents for follow up of mood issue. Notes clenching jaw a lot and causing headaches day and night. . Mood better. Sleeping a lot, not restorative. Takes long time to wake up in AM x yrs. Has latency and interruption. Naps and nods off a lot. CHART REVIEW  Health Maintenance   Topic Date Due    COVID-19 Vaccine (4 - Booster for Pfizer series) 03/06/2022    Pap smear  10/18/2022    Depression Screen  09/29/2023    DTaP/Tdap/Td vaccine (3 - Td or Tdap) 12/17/2030    Varicella vaccine  Completed    Flu vaccine  Completed    Hepatitis C screen  Completed    Hepatitis A vaccine  Aged Out    Hib vaccine  Aged Out    Meningococcal (ACWY) vaccine  Aged Out    Pneumococcal 0-64 years Vaccine  Aged Out    HIV screen  Discontinued     Prior to Visit Medications    Medication Sig Taking? Authorizing Provider   buPROPion (WELLBUTRIN XL) 150 MG extended release tablet Take 1 tablet by mouth every morning Yes Pepper Hinds MD   escitalopram (LEXAPRO) 5 MG tablet Take 1 tablet by mouth daily Yes Pepper Hinds MD      Social History     Tobacco Use    Smoking status: Never    Smokeless tobacco: Never   Vaping Use    Vaping Use: Never used   Substance Use Topics    Alcohol use: Yes     Comment: very rarely    Drug use: Never      LAST LABS  Cholesterol, Total   Date Value Ref Range Status   09/29/2022 189 0 - 199 mg/dL Final     LDL Calculated   Date Value Ref Range Status   09/29/2022 127 (H) <100 mg/dL Final     HDL   Date Value Ref Range Status   09/29/2022 42 40 - 60 mg/dL Final     Comment:     An HDL cholesterol less than 40 mg/dL is low and  constitutes a coronary heart disease risk factor. An HDL cholesterol greater than 60 mg/dL is a  negative risk factor for coronary heart disease.        Triglycerides   Date Value Ref Range Status   09/29/2022 99 0 - 150 mg/dL Final     Lab Results   Component Value Date    GLUCOSE 83 11/06/2020     Lab Results   Component Value Date     (L) 11/06/2020    K 4.1 11/06/2020    CREATININE 0.6 11/06/2020     Lab Results   Component Value Date    WBC 7.0 07/23/2018    HGB 13.8 07/23/2018    HCT 40.6 07/23/2018    MCV 90.0 07/23/2018     07/23/2018     Lab Results   Component Value Date    ALT 14 11/06/2020    AST 16 11/06/2020    ALKPHOS 71 11/06/2020    BILITOT 0.3 11/06/2020     TSH (uIU/mL)   Date Value   09/29/2022 1.84     Lab Results   Component Value Date    LABA1C 5.1 07/23/2018     Objective:   PHYSICAL EXAM  /80 (Site: Right Upper Arm, Position: Sitting, Cuff Size: Large Adult)   Pulse 72   Ht 5' 7\" (1.702 m)   Wt 199 lb (90.3 kg)   LMP 10/25/2022   SpO2 98%   BMI 31.17 kg/m²   BP Readings from Last 5 Encounters:   11/21/22 120/80   09/29/22 116/80   09/09/21 113/63   09/09/21 114/60   08/31/21 130/80     Wt Readings from Last 5 Encounters:   11/21/22 199 lb (90.3 kg)   09/29/22 200 lb (90.7 kg)   09/17/21 180 lb (81.6 kg)   09/09/21 180 lb (81.6 kg)   08/31/21 186 lb 3.2 oz (84.5 kg)      GENERAL: well-developed, well-nourished, alert, no distress, calm  PSYCH:  full facial expressions, good grooming, good insight, normal perception, normal reasoning, and normal speech pattern and content and normal thought patterns    The time spent counseling patient was 10 minutes of 15 minute appointment. Assessment and Plan:      Diagnosis Orders   1. Mood disorder ([address]  Will try to lower the SSRI to reduce side effects and add wellbutrin. buPROPion (WELLBUTRIN XL) 150 MG extended release tablet    escitalopram (LEXAPRO) 5 MG tablet      2. Sleep disorder  Chronic issue that has not improved with mood. Ambulatory referral to Sleep Medicine      Improved but having side effects. Plan as above and below.     COUNSELLING  Discussed use, benefit, and side effects of prescribed medications. Barriers to medication compliance addressed. All patient questions answered. Pt voiced understanding. INSTRUCTIONS  NEXT APPOINTMENT: Please schedule check-up in 2 months with Teodora Franco (Nurse Practitioner). See sleep specialist.  Start wellbutrin. In 1 week, decrease lexapro to half tab. Let me know how doing in 2-3 weeks.

## 2022-11-23 ENCOUNTER — OFFICE VISIT (OUTPATIENT)
Dept: PULMONOLOGY | Age: 27
End: 2022-11-23
Payer: COMMERCIAL

## 2022-11-23 VITALS
DIASTOLIC BLOOD PRESSURE: 80 MMHG | HEART RATE: 81 BPM | HEIGHT: 67 IN | BODY MASS INDEX: 31.39 KG/M2 | SYSTOLIC BLOOD PRESSURE: 110 MMHG | WEIGHT: 200 LBS | RESPIRATION RATE: 18 BRPM | TEMPERATURE: 97.9 F | OXYGEN SATURATION: 81 %

## 2022-11-23 DIAGNOSIS — F51.01 PRIMARY INSOMNIA: ICD-10-CM

## 2022-11-23 DIAGNOSIS — G47.33 OSA (OBSTRUCTIVE SLEEP APNEA): Primary | ICD-10-CM

## 2022-11-23 PROCEDURE — G8427 DOCREV CUR MEDS BY ELIG CLIN: HCPCS | Performed by: INTERNAL MEDICINE

## 2022-11-23 PROCEDURE — 99204 OFFICE O/P NEW MOD 45 MIN: CPT | Performed by: INTERNAL MEDICINE

## 2022-11-23 PROCEDURE — G8417 CALC BMI ABV UP PARAM F/U: HCPCS | Performed by: INTERNAL MEDICINE

## 2022-11-23 PROCEDURE — 1036F TOBACCO NON-USER: CPT | Performed by: INTERNAL MEDICINE

## 2022-11-23 PROCEDURE — G8482 FLU IMMUNIZE ORDER/ADMIN: HCPCS | Performed by: INTERNAL MEDICINE

## 2022-11-23 ASSESSMENT — SLEEP AND FATIGUE QUESTIONNAIRES
HOW LIKELY ARE YOU TO NOD OFF OR FALL ASLEEP WHILE SITTING QUIETLY AFTER LUNCH WITHOUT ALCOHOL: 2
HOW LIKELY ARE YOU TO NOD OFF OR FALL ASLEEP WHILE WATCHING TV: 2
HOW LIKELY ARE YOU TO NOD OFF OR FALL ASLEEP WHILE SITTING INACTIVE IN A PUBLIC PLACE: 1
HOW LIKELY ARE YOU TO NOD OFF OR FALL ASLEEP WHEN YOU ARE A PASSENGER IN A CAR FOR AN HOUR WITHOUT A BREAK: 2
HOW LIKELY ARE YOU TO NOD OFF OR FALL ASLEEP WHILE SITTING AND TALKING TO SOMEONE: 0
HOW LIKELY ARE YOU TO NOD OFF OR FALL ASLEEP IN A CAR, WHILE STOPPED FOR A FEW MINUTES IN TRAFFIC: 0
ESS TOTAL SCORE: 11
NECK CIRCUMFERENCE (INCHES): 18.5
HOW LIKELY ARE YOU TO NOD OFF OR FALL ASLEEP WHILE LYING DOWN TO REST IN THE AFTERNOON WHEN CIRCUMSTANCES PERMIT: 2
HOW LIKELY ARE YOU TO NOD OFF OR FALL ASLEEP WHILE SITTING AND READING: 2

## 2022-11-23 NOTE — PROGRESS NOTES
Pulmonary/sleep Consult           REASON FOR CONSULTATION:  Chief Complaint   Patient presents with    Establish Care    Insomnia        Consult at request of Eva Simmons MD     PCP: Eva Simmons MD        Assessment and Plan:   Diagnosis Orders   1. VIJAY (obstructive sleep apnea)  Home Sleep Study      2. Primary insomnia            Plan: Will obtain home sleep study to evaluate VIJAY  Advised to continue current antidepressant and follow up with pcp for titration as needed  Suspect primary insomnia             HISTORY OF PRESENT ILLNESS: Priscilla Damian is very pleasant 27y.o. year old lady with medical history stated below significant for history of generalized anxiety disorder/depression recently started on antidepressant, history of insomnia   She usually goes to bed around 9 PM does not fall asleep until midnight, multiple awakenings at night 4-5 times , difficulty falling back to sleep, tired throughout the day, sleepy takes naps during the weekend. No prior history of sleep study. Has gained 20 pounds over the last year. Past Medical History:   Diagnosis Date    IBS (irritable bowel syndrome)     Migraines     Wears glasses        Past Surgical History:   Procedure Laterality Date    ARM SURGERY Left 8/17/2021    LEFT ULNAR NERVE DECOMPRESSION AT ELBOW performed by Paula Souza MD at [address] [place] [address] Left 8/17/2021    LEFT CARPAL TUNNEL RELEASE performed by Paula Souza MD at [address] [place] [address] Right 9/9/2021    RIGHT CARPAL TUNNEL RELEASE performed by Paula Souza MD at [address] [place] [address]  2008    WISDOM TOOTH EXTRACTION         family history includes Heart Disease in her maternal grandfather; No Known Problems in her father and mother. SOCIAL HISTORY:   reports that she has never smoked. She has been exposed to tobacco smoke.  She has never used smokeless tobacco.      ALLERGIES:  Patient has No Known Allergies. REVIEW OF SYSTEMS:  Constitutional: Negative for fever, no wt loss, no night sweats   HENT: Negative for sore throat, difficulty swallowing,   Eyes: Negative for redness, no discharge   Respiratory: Negative for dyspnea, cough, wheezing   Cardiovascular: Negative for chest pain, no palpitations   Gastrointestinal: Negative for vomiting, diarrhea   Genitourinary: Negative for hematuria, no dysuria    Musculoskeletal: Negative for arthralgias, no joint swelling   Skin: Negative for rash  LE: no edema   Neurological: Negative for syncope, no tremor, no focal weakness or dysarthria   Hematological: Negative for adenopathy, or bleeding   Psychiatric/Behavorial: Anxiety, insomnia  Objective:   PHYSICAL EXAM:  Blood pressure 110/80, pulse 81, temperature 97.9 °F (36.6 °C), resp. rate 18, height 5' 7\" (1.702 m), weight 200 lb (90.7 kg), last menstrual period 10/25/2022, SpO2 (!) 81 %.'  Gen: No acute distress  Eyes: PERRL. No sclera icterus. No conjunctival injection. ENT: No discharge. Pharynx clear. External appearance of ears and nose normal.  Neck: Trachea midline. No obvious mass. Resp: No accessory muscle use. No crackles. No wheezes. No rhonchi. CV: Regular rate. Regular rhythm. No murmur or rub. No edema. GI: Non-tender. Non-distended. No hernia. Skin: Warm, dry, normal texture and turgor. No nodule on exposed extremities. Lymph: No cervical LAD. M/S: No cyanosis. No clubbing. No joint deformity. LE:  no edema   Neuro: no tremor, no focal deficit, awake and alert   Psych: intact judgement and insight. Current Outpatient Medications   Medication Sig Dispense Refill    buPROPion (WELLBUTRIN XL) 150 MG extended release tablet Take 1 tablet by mouth every morning 30 tablet 3    escitalopram (LEXAPRO) 5 MG tablet Take 1 tablet by mouth daily 30 tablet 2     No current facility-administered medications for this visit.        Data Reviewed:   CBC and Renal reviewed  Last CBC  Lab Results   Component Value Date/Time    WBC 7.0 07/23/2018 08:24 AM    RBC 4.51 07/23/2018 08:24 AM    HGB 13.8 07/23/2018 08:24 AM    MCV 90.0 07/23/2018 08:24 AM     07/23/2018 08:24 AM     Last Renal  Lab Results   Component Value Date/Time     11/06/2020 09:39 AM    K 4.1 11/06/2020 09:39 AM    CL 99 11/06/2020 09:39 AM    CO2 26 11/06/2020 09:39 AM    CO2 23 07/23/2018 08:24 AM    BUN 12 11/06/2020 09:39 AM    CREATININE 0.6 11/06/2020 09:39 AM    GLUCOSE 83 11/06/2020 09:39 AM    CALCIUM 9.2 11/06/2020 09:39 AM           This note was transcribed using [id number] [employer]. Please disregard any translational errors.     Marion Solis MD  [facility] Pulmonary, Sleep and Critical Care

## 2022-12-12 ENCOUNTER — HOSPITAL ENCOUNTER (OUTPATIENT)
Dept: SLEEP CENTER | Age: 27
Discharge: HOME | End: 2022-12-12
Payer: COMMERCIAL

## 2022-12-12 PROCEDURE — 95806 SLEEP STUDY UNATT&RESP EFFT: CPT

## 2022-12-15 PROBLEM — G47.33 OSA (OBSTRUCTIVE SLEEP APNEA): Status: ACTIVE | Noted: 2022-12-15

## 2022-12-21 ENCOUNTER — TELEPHONE (OUTPATIENT)
Dept: PULMONOLOGY | Age: 27
End: 2022-12-21

## 2022-12-21 NOTE — PROGRESS NOTES
Trina Ramos         : 1995        PHONE: [phone] (home)   [] [facility]     [] [address]      [] [facility]     [][employer]    [] [employer]  [] [facility]     [] Other:    Diagnosis: [x] VIJAY (G47.33) [] CSA (G47.31) [] Apnea (G47.30)   Length of Need: [] 12 Months [x] 99 Months [] Other:    Machine (MIGUEL!): [] Respironics Dream Station      Auto [x] ResMed AirSense     Auto [] Other:     [x]  CPAP () [] Bilevel ()   Mode: [x] Auto [] Spontaneous    Mode: [] Auto [] Spontaneous      5-12 cm                    Comfort Settings:   - Ramp Pressure: 5 cmH2O                                        - Ramp time: 15 min                                     -  Flex/EPR - 3 full time                                    - For ResMed Bilevel (TiMax-4 sec   TiMin- 0.2 sec)     Humidifier: [] Heated ()        [x] Water chamber replacement ()/ 1 per 6 months        Mask:   [x] Nasal () /1 per 3 months [] Full Face () /1 per 3 months   [x] Patient choice -Size and fit mask [] Patient Choice - Size and fit mask   [] Dispense:  [] Dispense:    [] Headgear () / 1 per 3 months [] Headgear () / 1 per 3 months   [] Replacement Nasal Cushion ()/2 per month [] Interface Replacement ()/1 per month   [] Replacement Nasal Pillows ()/2 per month         Tubing: [x] Heated ()/1 per 3 months    [] Standard ()/1 per 3 months [] Other:           Filters: [x] Non-disposable ()/1 per 6 months     [x] Ultra-Fine, Disposable ()/2 per month        Miscellaneous: [] Chin Strap ()/ 1 per 6 months [] O2 bleed-in:       LPM   [] Oximetry on CPAP/Bilevel []  Other:          Start Order Date: 22    MEDICAL JUSTIFICATION:  I, the undersigned, certify that the above prescribed supplies are medically necessary for this patients wellbeing.   In my opinion, the supplies are both reasonable and necessary in reference to accepted standards of medicalpractice in treatment of this patients condition.     Lisandro Finley MD      NPI: [id number]       Order Signed Date: 12/21/22    Electronically signed by Lisandro Finley MD on 12/21/2022 at 1:10 PM

## 2022-12-21 NOTE — TELEPHONE ENCOUNTER
Home Sleep study showed mild VIJAY. AHI was 10.2  per hr. And O2 Desaturations to 92%. The patient has been notified of this information and all questions answered. PT has chose to send her order to [facility]  Order faxed.

## 2023-01-05 ENCOUNTER — OFFICE VISIT (OUTPATIENT)
Dept: FAMILY MEDICINE CLINIC | Age: 28
End: 2023-01-05
Payer: COMMERCIAL

## 2023-01-05 VITALS
BODY MASS INDEX: 32.96 KG/M2 | WEIGHT: 210 LBS | SYSTOLIC BLOOD PRESSURE: 110 MMHG | HEART RATE: 70 BPM | DIASTOLIC BLOOD PRESSURE: 80 MMHG | OXYGEN SATURATION: 98 % | HEIGHT: 67 IN

## 2023-01-05 DIAGNOSIS — F39 MOOD DISORDER (HCC): Primary | ICD-10-CM

## 2023-01-05 PROCEDURE — G8482 FLU IMMUNIZE ORDER/ADMIN: HCPCS | Performed by: FAMILY MEDICINE

## 2023-01-05 PROCEDURE — G8417 CALC BMI ABV UP PARAM F/U: HCPCS | Performed by: FAMILY MEDICINE

## 2023-01-05 PROCEDURE — 1036F TOBACCO NON-USER: CPT | Performed by: FAMILY MEDICINE

## 2023-01-05 PROCEDURE — G8427 DOCREV CUR MEDS BY ELIG CLIN: HCPCS | Performed by: FAMILY MEDICINE

## 2023-01-05 PROCEDURE — 99213 OFFICE O/P EST LOW 20 MIN: CPT | Performed by: FAMILY MEDICINE

## 2023-01-05 RX ORDER — ESCITALOPRAM OXALATE 5 MG/1
5 TABLET ORAL DAILY
Qty: 90 TABLET | Refills: 1 | Status: SHIPPED | OUTPATIENT
Start: 2023-01-05

## 2023-01-05 NOTE — PROGRESS NOTES
Mood Visit  Subjective:     Chief Complaint   Patient presents with    Depression     F/u mood      Neil Doran is a 32 y.o. female who presents for follow up of mood issue. Just started CPAP last night. Getting used to it. Doing well with meds. No side effects. Migraines once a month and OTC works. Sometimes has to leave work. CHART REVIEW  Health Maintenance   Topic Date Due    COVID-19 Vaccine (4 - Booster for Pfizer series) 03/06/2022    Pap smear  10/22/2024 (Originally 10/18/2022)    Depression Screen  09/29/2023    DTaP/Tdap/Td vaccine (3 - Td or Tdap) 12/17/2030    Varicella vaccine  Completed    Flu vaccine  Completed    Hepatitis C screen  Completed    Hepatitis A vaccine  Aged Out    Hib vaccine  Aged Out    Meningococcal (ACWY) vaccine  Aged Out    Pneumococcal 0-64 years Vaccine  Aged Out    HIV screen  Discontinued     Prior to Visit Medications    Medication Sig Taking? Authorizing Provider   escitalopram (LEXAPRO) 5 MG tablet Take 1 tablet by mouth daily Yes Carole Bonilla MD      Social History     Tobacco Use    Smoking status: Never     Passive exposure: Past    Smokeless tobacco: Never   Vaping Use    Vaping Use: Never used   Substance Use Topics    Alcohol use: Yes     Comment: very rarely    Drug use: Never      LAST LABS  Cholesterol, Total   Date Value Ref Range Status   09/29/2022 189 0 - 199 mg/dL Final     LDL Calculated   Date Value Ref Range Status   09/29/2022 127 (H) <100 mg/dL Final     HDL   Date Value Ref Range Status   09/29/2022 42 40 - 60 mg/dL Final     Comment:     An HDL cholesterol less than 40 mg/dL is low and  constitutes a coronary heart disease risk factor. An HDL cholesterol greater than 60 mg/dL is a  negative risk factor for coronary heart disease.        Triglycerides   Date Value Ref Range Status   09/29/2022 99 0 - 150 mg/dL Final     Lab Results   Component Value Date    GLUCOSE 83 11/06/2020     Lab Results   Component Value Date     (L) 11/06/2020    K 4.1 11/06/2020    CREATININE 0.6 11/06/2020     Lab Results   Component Value Date    WBC 7.0 07/23/2018    HGB 13.8 07/23/2018    HCT 40.6 07/23/2018    MCV 90.0 07/23/2018     07/23/2018     Lab Results   Component Value Date    ALT 14 11/06/2020    AST 16 11/06/2020    ALKPHOS 71 11/06/2020    BILITOT 0.3 11/06/2020     TSH (uIU/mL)   Date Value   09/29/2022 1.84     Lab Results   Component Value Date    LABA1C 5.1 07/23/2018     Objective:   PHYSICAL EXAM  /80 (Site: Left Upper Arm, Position: Sitting, Cuff Size: Large Adult)   Pulse 70   Ht 5' 7\" (1.702 m)   Wt 210 lb (95.3 kg)   LMP 01/01/2023   SpO2 98%   BMI 32.89 kg/m²   BP Readings from Last 5 Encounters:   01/05/23 110/80   11/23/22 110/80   11/21/22 120/80   09/29/22 116/80   09/09/21 113/63     Wt Readings from Last 5 Encounters:   01/05/23 210 lb (95.3 kg)   11/23/22 200 lb (90.7 kg)   11/21/22 199 lb (90.3 kg)   09/29/22 200 lb (90.7 kg)   09/17/21 180 lb (81.6 kg)      GENERAL: well-developed, well-nourished, alert, no distress, calm  PSYCH:  full facial expressions, good grooming, good insight, normal perception, normal reasoning, and normal speech pattern and content and normal thought patterns    The time spent counseling patient was 10 minutes of 15 minute appointment. Reviewed concept of anxiety as biochemical imbalance of neurotransmitters and rationale for treatment. Instructed patient to contact office or on-call physician promptly should condition worsen or any new symptoms appear and provided on-call telephone numbers. IF THE PATIENT HAS ANY SUICIDAL OR HOMICIDAL IDEATIONS, CALL THE OFFICE, DISCUSS WITH A SUPPORT MEMBER, OR GO TO THE ER IMMEDIATELY. Patient was agreeable with this plan. Assessment and Plan:      Diagnosis Orders   1. Mood disorder (HCC)        Stable. Plan as above and below. COUNSELLING  Discussed use, benefit, and side effects of prescribed medications.   Barriers to medication compliance addressed. All patient questions answered. Pt voiced understanding. INSTRUCTIONS  NEXT APPOINTMENT:  Please schedule annual complete physical (30 minutes) in 6 months   Would get new bivalent COVID booster soon. Has better coverage for Omicron variant. Separate from other vaccines by at least 2 weeks.    Plan PAP fall 2024

## 2023-01-12 ENCOUNTER — OFFICE VISIT (OUTPATIENT)
Dept: PULMONOLOGY | Age: 28
End: 2023-01-12
Payer: COMMERCIAL

## 2023-01-12 VITALS
OXYGEN SATURATION: 98 % | WEIGHT: 208 LBS | HEIGHT: 67 IN | DIASTOLIC BLOOD PRESSURE: 80 MMHG | SYSTOLIC BLOOD PRESSURE: 115 MMHG | HEART RATE: 70 BPM | TEMPERATURE: 96.9 F | BODY MASS INDEX: 32.65 KG/M2 | RESPIRATION RATE: 21 BRPM

## 2023-01-12 DIAGNOSIS — G47.33 OSA (OBSTRUCTIVE SLEEP APNEA): Primary | ICD-10-CM

## 2023-01-12 PROCEDURE — G8417 CALC BMI ABV UP PARAM F/U: HCPCS | Performed by: INTERNAL MEDICINE

## 2023-01-12 PROCEDURE — 99213 OFFICE O/P EST LOW 20 MIN: CPT | Performed by: INTERNAL MEDICINE

## 2023-01-12 PROCEDURE — G8482 FLU IMMUNIZE ORDER/ADMIN: HCPCS | Performed by: INTERNAL MEDICINE

## 2023-01-12 PROCEDURE — G8427 DOCREV CUR MEDS BY ELIG CLIN: HCPCS | Performed by: INTERNAL MEDICINE

## 2023-01-12 PROCEDURE — 1036F TOBACCO NON-USER: CPT | Performed by: INTERNAL MEDICINE

## 2023-01-16 NOTE — PROGRESS NOTES
Pulmonary progress note           REASON FOR CONSULTATION:  Chief Complaint   Patient presents with    Follow-up        Consult at request of Alexus Mccain MD     PCP: Alexus Mccain MD        Assessment and Plan:   Diagnosis Orders   1. VIJAY (obstructive sleep apnea)              Plan:  APAP  Follow up with download in 3 months             HISTORY OF PRESENT ILLNESS: Kymberly Morales is very pleasant 32y.o. year old lady with medical history stated below significant for history of generalized anxiety disorder/depression recently started on antidepressant, history of insomnia   AHI of 10  Started on APAP  Has been on for one week          Past Medical History:   Diagnosis Date    IBS (irritable bowel syndrome)     Migraines     Wears glasses        Past Surgical History:   Procedure Laterality Date    ARM SURGERY Left 8/17/2021    LEFT ULNAR NERVE DECOMPRESSION AT ELBOW performed by Lorenzo Molina MD at [facility] Left 8/17/2021    LEFT CARPAL TUNNEL RELEASE performed by Lorenzo Molina MD at [facility] Right 9/9/2021    RIGHT CARPAL TUNNEL RELEASE performed by Lorenzo Molina MD at [address] [place] [address]    WISDOM TOOTH EXTRACTION         family history includes Heart Disease in her maternal grandfather; No Known Problems in her father and mother. SOCIAL HISTORY:   reports that she has never smoked. She has been exposed to tobacco smoke. She has never used smokeless tobacco.      ALLERGIES:  Patient has No Known Allergies.     REVIEW OF SYSTEMS:  Constitutional: Negative for fever, no wt loss, no night sweats   HENT: Negative for sore throat, difficulty swallowing,   Eyes: Negative for redness, no discharge   Respiratory: Negative for dyspnea, cough, wheezing   Cardiovascular: Negative for chest pain, no palpitations   Gastrointestinal: Negative for vomiting, diarrhea   Genitourinary: Negative for hematuria, no dysuria    Musculoskeletal: Negative for arthralgias, no joint swelling   Skin: Negative for rash  LE: no edema   Neurological: Negative for syncope, no tremor, no focal weakness or dysarthria   Hematological: Negative for adenopathy, or bleeding   Psychiatric/Behavorial: Anxiety, insomnia  Objective:   PHYSICAL EXAM:  Blood pressure 115/80, pulse 70, temperature 96.9 °F (36.1 °C), resp. rate 21, height 5' 7\" (1.702 m), weight 208 lb (94.3 kg), last menstrual period 01/01/2023, SpO2 98 %.'  Gen: No acute distress  Eyes: PERRL. No sclera icterus. No conjunctival injection. ENT: No discharge. Pharynx clear. External appearance of ears and nose normal.  Neck: Trachea midline. No obvious mass. Resp: No accessory muscle use. No crackles. No wheezes. No rhonchi. CV: Regular rate. Regular rhythm. No murmur or rub. No edema. GI: Non-tender. Non-distended. No hernia. Skin: Warm, dry, normal texture and turgor. No nodule on exposed extremities. Lymph: No cervical LAD. M/S: No cyanosis. No clubbing. No joint deformity. LE:  no edema   Neuro: no tremor, no focal deficit, awake and alert   Psych: intact judgement and insight. Current Outpatient Medications   Medication Sig Dispense Refill    escitalopram (LEXAPRO) 5 MG tablet Take 1 tablet by mouth daily 90 tablet 1     No current facility-administered medications for this visit.        Data Reviewed:   CBC and Renal reviewed  Last CBC  Lab Results   Component Value Date/Time    WBC 7.0 07/23/2018 08:24 AM    RBC 4.51 07/23/2018 08:24 AM    HGB 13.8 07/23/2018 08:24 AM    MCV 90.0 07/23/2018 08:24 AM     07/23/2018 08:24 AM     Last Renal  Lab Results   Component Value Date/Time     11/06/2020 09:39 AM    K 4.1 11/06/2020 09:39 AM    CL 99 11/06/2020 09:39 AM    CO2 26 11/06/2020 09:39 AM    CO2 23 07/23/2018 08:24 AM    BUN 12 11/06/2020 09:39 AM    CREATININE 0.6 11/06/2020 09:39 AM GLUCOSE 83 11/06/2020 09:39 AM    CALCIUM 9.2 11/06/2020 09:39 AM           This note was transcribed using [id number] [employer]. Please disregard any translational errors.     Michael Clifford MD  [facility] Pulmonary, Sleep and Critical Care

## 2023-04-18 ENCOUNTER — OFFICE VISIT (OUTPATIENT)
Dept: FAMILY MEDICINE CLINIC | Age: 28
End: 2023-04-18
Payer: COMMERCIAL

## 2023-04-18 VITALS
OXYGEN SATURATION: 100 % | DIASTOLIC BLOOD PRESSURE: 80 MMHG | SYSTOLIC BLOOD PRESSURE: 104 MMHG | HEIGHT: 67 IN | BODY MASS INDEX: 33.59 KG/M2 | HEART RATE: 66 BPM | WEIGHT: 214 LBS

## 2023-04-18 DIAGNOSIS — R00.0 TACHYCARDIA: Primary | ICD-10-CM

## 2023-04-18 DIAGNOSIS — N91.2 AMENORRHEA, UNSPECIFIED: ICD-10-CM

## 2023-04-18 PROCEDURE — 1036F TOBACCO NON-USER: CPT

## 2023-04-18 PROCEDURE — G8417 CALC BMI ABV UP PARAM F/U: HCPCS

## 2023-04-18 PROCEDURE — 93000 ELECTROCARDIOGRAM COMPLETE: CPT

## 2023-04-18 PROCEDURE — 99213 OFFICE O/P EST LOW 20 MIN: CPT

## 2023-04-18 PROCEDURE — G8427 DOCREV CUR MEDS BY ELIG CLIN: HCPCS

## 2023-04-18 SDOH — ECONOMIC STABILITY: HOUSING INSECURITY
IN THE LAST 12 MONTHS, WAS THERE A TIME WHEN YOU DID NOT HAVE A STEADY PLACE TO SLEEP OR SLEPT IN A SHELTER (INCLUDING NOW)?: NO

## 2023-04-18 SDOH — ECONOMIC STABILITY: FOOD INSECURITY: WITHIN THE PAST 12 MONTHS, YOU WORRIED THAT YOUR FOOD WOULD RUN OUT BEFORE YOU GOT MONEY TO BUY MORE.: NEVER TRUE

## 2023-04-18 SDOH — ECONOMIC STABILITY: FOOD INSECURITY: WITHIN THE PAST 12 MONTHS, THE FOOD YOU BOUGHT JUST DIDN'T LAST AND YOU DIDN'T HAVE MONEY TO GET MORE.: NEVER TRUE

## 2023-04-18 SDOH — ECONOMIC STABILITY: INCOME INSECURITY: HOW HARD IS IT FOR YOU TO PAY FOR THE VERY BASICS LIKE FOOD, HOUSING, MEDICAL CARE, AND HEATING?: NOT VERY HARD

## 2023-04-18 ASSESSMENT — ENCOUNTER SYMPTOMS
SHORTNESS OF BREATH: 0
ABDOMINAL PAIN: 0
WHEEZING: 0

## 2023-04-18 NOTE — PROGRESS NOTES
4/18/2023    This is a 32 y.o. female   Chief Complaint   Patient presents with    Irregular Heart Beat     Heart rate has been up with resting and activity. MP is 2 weeks late. [facility]  Has been having elevated HR since Friday  Fitbit showing intermittent -140  Normal HR is 60 bpm, HR has not dropped below 80 bpm resting  Having lightheadedness with these episodes of tachycardia  No LOC or syncope    Had an energy drink on Friday AM- started with high heart rates and dizzy spells that afternoon  Did not drink any caffeine over the weekend  Denies alcohol or drug use    She is almost 3 weeks late on her period  She is not using contraception, she is open to children  She has taken multiple at home pregnancy tests that have all been negative    No chest pain, SOB, difficulty breathing    No recent illnesses     Patient Active Problem List   Diagnosis    Migraine with aura and without status migrainosus, not intractable    Uterine fibroid    Mood disorder (HCC)    VIJAY (obstructive sleep apnea)       Current Outpatient Medications   Medication Sig Dispense Refill    escitalopram (LEXAPRO) 5 MG tablet Take 1 tablet by mouth daily 90 tablet 1     No current facility-administered medications for this visit. No Known Allergies    Review of Systems   Constitutional:  Negative for activity change, diaphoresis, fatigue and fever. Respiratory:  Negative for shortness of breath and wheezing. Cardiovascular:  Positive for palpitations. Negative for chest pain and leg swelling. Gastrointestinal:  Negative for abdominal pain. Neurological:  Negative for dizziness and headaches. Psychiatric/Behavioral:  The patient is not nervous/anxious. Vitals:    04/18/23 1045   BP: 104/80   Site: Left Upper Arm   Position: Sitting   Cuff Size: Large Adult   Pulse: 66   SpO2: 100%   Weight: 214 lb (97.1 kg)   Height: 5' 7\" (1.702 m)       Body mass index is 33.52 kg/m².      Wt Readings from Last 3 Encounters:

## 2023-04-19 DIAGNOSIS — R79.89 ELEVATED LFTS: Primary | ICD-10-CM

## 2023-05-01 ENCOUNTER — HOSPITAL ENCOUNTER (OUTPATIENT)
Dept: NON INVASIVE DIAGNOSTICS | Age: 28
Discharge: HOME | End: 2023-05-01
Payer: COMMERCIAL

## 2023-05-01 DIAGNOSIS — R00.0 TACHYCARDIA: ICD-10-CM

## 2023-05-01 PROCEDURE — 93225 XTRNL ECG REC<48 HRS REC: CPT

## 2023-05-01 PROCEDURE — 93226 XTRNL ECG REC<48 HR SCAN A/R: CPT

## 2023-05-04 LAB
ACQUISITION DURATION: NORMAL S
AVERAGE HEART RATE: 72 BPM
EKG DIAGNOSIS: NORMAL
HOLTER MAX HEART RATE: 125 BPM
HOOKUP DATE: NORMAL
HOOKUP TIME: NORMAL
MAX HEART RATE TIME/DATE: NORMAL
MIN HEART RATE TIME/DATE: NORMAL
MIN HEART RATE: 49 BPM
NUMBER OF QRS COMPLEXES: NORMAL
NUMBER OF SUPRAVENTRICULAR COUPLETS: 0
NUMBER OF SUPRAVENTRICULAR ECTOPICS: 1
NUMBER OF SUPRAVENTRICULAR ISOLATED BEATS: 1
NUMBER OF VENTRICULAR BIGEMINAL CYCLES: 0
NUMBER OF VENTRICULAR COUPLETS: 0
NUMBER OF VENTRICULAR ECTOPICS: 0

## 2023-05-16 DIAGNOSIS — R79.89 ELEVATED LFTS: Primary | ICD-10-CM

## 2023-05-16 DIAGNOSIS — R79.89 ELEVATED LFTS: ICD-10-CM

## 2023-05-16 LAB
ALBUMIN SERPL-MCNC: 4.7 G/DL (ref 3.4–5)
ALP SERPL-CCNC: 101 U/L (ref 40–129)
ALT SERPL-CCNC: 78 U/L (ref 10–40)
AST SERPL-CCNC: 51 U/L (ref 15–37)
BILIRUB DIRECT SERPL-MCNC: <0.2 MG/DL (ref 0–0.3)
BILIRUB INDIRECT SERPL-MCNC: ABNORMAL MG/DL (ref 0–1)
BILIRUB SERPL-MCNC: 0.3 MG/DL (ref 0–1)
PROT SERPL-MCNC: 7.2 G/DL (ref 6.4–8.2)

## 2023-05-16 PROCEDURE — 36415 COLL VENOUS BLD VENIPUNCTURE: CPT

## 2023-05-23 ENCOUNTER — HOSPITAL ENCOUNTER (OUTPATIENT)
Dept: ULTRASOUND IMAGING | Age: 28
Discharge: HOME | End: 2023-05-23
Payer: COMMERCIAL

## 2023-05-23 DIAGNOSIS — R79.89 ELEVATED LFTS: ICD-10-CM

## 2023-05-23 PROBLEM — K76.0 HEPATIC STEATOSIS: Status: ACTIVE | Noted: 2023-05-23

## 2023-05-23 PROCEDURE — 76700 US EXAM ABDOM COMPLETE: CPT

## 2023-07-06 ENCOUNTER — OFFICE VISIT (OUTPATIENT)
Dept: FAMILY MEDICINE CLINIC | Age: 28
End: 2023-07-06
Payer: COMMERCIAL

## 2023-07-06 VITALS
HEART RATE: 71 BPM | HEIGHT: 67 IN | OXYGEN SATURATION: 98 % | WEIGHT: 206 LBS | DIASTOLIC BLOOD PRESSURE: 76 MMHG | BODY MASS INDEX: 32.33 KG/M2 | SYSTOLIC BLOOD PRESSURE: 100 MMHG

## 2023-07-06 DIAGNOSIS — F39 MOOD DISORDER (HCC): ICD-10-CM

## 2023-07-06 DIAGNOSIS — K76.0 HEPATIC STEATOSIS: ICD-10-CM

## 2023-07-06 DIAGNOSIS — Z00.00 WELL ADULT HEALTH CHECK: Primary | ICD-10-CM

## 2023-07-06 LAB
ALBUMIN SERPL-MCNC: 4.8 G/DL (ref 3.4–5)
ALBUMIN/GLOB SERPL: 1.8 {RATIO} (ref 1.1–2.2)
ALP SERPL-CCNC: 110 U/L (ref 40–129)
ALT SERPL-CCNC: 63 U/L (ref 10–40)
ANION GAP SERPL CALCULATED.3IONS-SCNC: 14 MMOL/L (ref 3–16)
AST SERPL-CCNC: 37 U/L (ref 15–37)
BILIRUB SERPL-MCNC: 0.3 MG/DL (ref 0–1)
BUN SERPL-MCNC: 12 MG/DL (ref 7–20)
CALCIUM SERPL-MCNC: 10.1 MG/DL (ref 8.3–10.6)
CHLORIDE SERPL-SCNC: 102 MMOL/L (ref 99–110)
CHOLEST SERPL-MCNC: 225 MG/DL (ref 0–199)
CO2 SERPL-SCNC: 25 MMOL/L (ref 21–32)
CREAT SERPL-MCNC: 0.7 MG/DL (ref 0.6–1.1)
GFR SERPLBLD CREATININE-BSD FMLA CKD-EPI: >60 ML/MIN/{1.73_M2}
GLUCOSE SERPL-MCNC: 86 MG/DL (ref 70–99)
HDLC SERPL-MCNC: 42 MG/DL (ref 40–60)
LDLC SERPL CALC-MCNC: 140 MG/DL
POTASSIUM SERPL-SCNC: 5.1 MMOL/L (ref 3.5–5.1)
PROT SERPL-MCNC: 7.4 G/DL (ref 6.4–8.2)
SODIUM SERPL-SCNC: 141 MMOL/L (ref 136–145)
TRIGL SERPL-MCNC: 215 MG/DL (ref 0–150)
VLDLC SERPL CALC-MCNC: 43 MG/DL

## 2023-07-06 PROCEDURE — 99395 PREV VISIT EST AGE 18-39: CPT | Performed by: FAMILY MEDICINE

## 2023-07-06 RX ORDER — ESCITALOPRAM OXALATE 5 MG/1
5 TABLET ORAL DAILY
Qty: 90 TABLET | Refills: 1 | Status: SHIPPED | OUTPATIENT
Start: 2023-07-06

## 2023-07-06 NOTE — PATIENT INSTRUCTIONS
Are the Signs and Symptoms of Problem Sleepiness? Sleep deficiency can cause you to feel very tired during the day. You may not feel refreshed and alert when you wake up. Sleep deficiency also can interfere with work, school, driving, and social functioning. How sleepy you feel during the day can help you figure out whether you're having symptoms of problem sleepiness. You might be sleep deficient if you often feel like you could doze off while:  Sitting and reading or watching TV   Sitting still in a public place, such as a movie theater, meeting, or classroom   Riding in a car for an hour without stopping   Sitting and talking to someone   Sitting quietly after lunch   Sitting in traffic for a few minutes  Sleep deficiency can cause problems with learning, focusing, and reacting. You may have trouble making decisions, solving problems, remembering things, controlling your emotions and behavior, and coping with change. You may take longer to finish tasks, have a slower reaction time, and make more mistakes. The signs and symptoms of sleep deficiency may differ between children and adults. Children who are sleep deficient might be overly active and have problems paying attention. They also might misbehave, and their school performance can suffer. Sleep-deficient children may feel angry and impulsive, have mood swings, feel sad or depressed, or lack motivation. You may not notice how sleep deficiency affects your daily routine. A common myth is that people can learn to get by on little sleep with no negative effects. However, research shows that getting enough quality sleep at the right times is vital for mental health, physical health, quality of life, and safety. To find out whether you're sleep deficient, try keeping a sleep diary for a couple of weeks. Write down how much you sleep each night, how alert and rested you feel in the morning, and how sleepy you feel during the day.     Strategies for Getting

## 2023-07-06 NOTE — PROGRESS NOTES
PHYSICAL-VISIT NOTE   Assessment and Plan:      Diagnosis Orders   1. Well adult health check        2. Hepatic steatosis- ultrasound May 2023  Comprehensive Metabolic Panel    Lipid Panel      3. Mood disorder (HCC)  escitalopram (LEXAPRO) 5 MG tablet      Stable. Plan as above and below. INSTRUCTIONS  NEXT APPOINTMENT: Please schedule check-up in 6 months with Dr. Letty Marshall or her NP, Landon Diaz. PLEASE TAKE THIS FORM TO CHECK-OUT WINDOW TO SCHEDULE NEXT VISIT. PLEASE GET BLOODWORK DRAWN TODAY ON FIRST FLOOR in 170. Take orders with you. RESULTS- most blood tests back in couple days. We will call you if any problems. If bloodwork good, you will get letter in mail or notified thru [facility] (if signed up) within 2 weeks. If you do not, please call office. Please get flu vaccine when available in fall. Can get either at this office or at stores such as [employer] and [employer]. Would get new bivalent COVID booster in fall. Sooner if COVID numbers go up over summer and it has been over a year since last booster. Separate from other vaccines by at least 2 weeks. Would get new bivalent COVID booster in fall. Sooner if COVID numbers go up over summer and it has been over a year since last booster. Separate from other vaccines by at least 2 weeks. Consider seeing sleep doctor again     Subjective:     Chief Complaint   Patient presents with    Annual Exam     Yearly check up       Luis Villatoro is a 32 y.o. female who presents for annual testing/preventive review and check-up of medical problems listed below:  1. Well adult health check    2. Hepatic steatosis- ultrasound May 2023    3. Mood disorder ([address])        Complaints: skipped a menses, saw GYN, heavy after progesterone challenge. ROS  See scanned \"Annual Adult Health Checklist\". Pertinent positives addressed above.     CHART REVIEW  Social History     Tobacco Use    Smoking status: Never     Passive exposure: Past    Smokeless tobacco: Never

## 2023-11-18 DIAGNOSIS — F39 MOOD DISORDER (HCC): ICD-10-CM

## 2023-11-20 RX ORDER — ESCITALOPRAM OXALATE 5 MG/1
5 TABLET ORAL DAILY
Qty: 90 TABLET | Refills: 1 | Status: SHIPPED | OUTPATIENT
Start: 2023-11-20

## 2024-01-08 ENCOUNTER — OFFICE VISIT (OUTPATIENT)
Dept: FAMILY MEDICINE CLINIC | Age: 29
End: 2024-01-08
Payer: COMMERCIAL

## 2024-01-08 VITALS
BODY MASS INDEX: 33.43 KG/M2 | HEIGHT: 67 IN | OXYGEN SATURATION: 98 % | SYSTOLIC BLOOD PRESSURE: 104 MMHG | WEIGHT: 213 LBS | HEART RATE: 63 BPM | DIASTOLIC BLOOD PRESSURE: 72 MMHG

## 2024-01-08 DIAGNOSIS — Z23 NEED FOR HEPATITIS B VACCINATION: ICD-10-CM

## 2024-01-08 DIAGNOSIS — D25.9 UTERINE LEIOMYOMA, UNSPECIFIED LOCATION: ICD-10-CM

## 2024-01-08 DIAGNOSIS — F39 MOOD DISORDER (HCC): Primary | ICD-10-CM

## 2024-01-08 DIAGNOSIS — K76.0 HEPATIC STEATOSIS: ICD-10-CM

## 2024-01-08 PROCEDURE — G8417 CALC BMI ABV UP PARAM F/U: HCPCS

## 2024-01-08 PROCEDURE — 99214 OFFICE O/P EST MOD 30 MIN: CPT

## 2024-01-08 PROCEDURE — 1036F TOBACCO NON-USER: CPT

## 2024-01-08 PROCEDURE — 90739 HEPB VACC 2/4 DOSE ADULT IM: CPT

## 2024-01-08 PROCEDURE — G8427 DOCREV CUR MEDS BY ELIG CLIN: HCPCS

## 2024-01-08 PROCEDURE — 90471 IMMUNIZATION ADMIN: CPT

## 2024-01-08 PROCEDURE — G8482 FLU IMMUNIZE ORDER/ADMIN: HCPCS

## 2024-01-08 ASSESSMENT — ENCOUNTER SYMPTOMS
SHORTNESS OF BREATH: 0
ABDOMINAL PAIN: 0

## 2024-01-08 ASSESSMENT — PATIENT HEALTH QUESTIONNAIRE - PHQ9
SUM OF ALL RESPONSES TO PHQ QUESTIONS 1-9: 0
SUM OF ALL RESPONSES TO PHQ QUESTIONS 1-9: 0
SUM OF ALL RESPONSES TO PHQ9 QUESTIONS 1 & 2: 0
SUM OF ALL RESPONSES TO PHQ QUESTIONS 1-9: 0
SUM OF ALL RESPONSES TO PHQ QUESTIONS 1-9: 0
1. LITTLE INTEREST OR PLEASURE IN DOING THINGS: 0
2. FEELING DOWN, DEPRESSED OR HOPELESS: 0

## 2024-01-08 NOTE — PROGRESS NOTES
1/8/2024    This is a 28 y.o. female   Chief Complaint   Patient presents with    mood disorder     6 mo f/u mood   .    HPI    Had to put down 17 year old cat just before Scar, so has been bummed out about that, but other wise doing well.    Mood is good on lexapro 5 mg; no side effects noted  No new stressors    Menses have been more regular since progesterone challenge through gyn    Has been working on her diet and working with a  since finding out about her fatty liver disease.     Patient Active Problem List   Diagnosis    Migraine with aura and without status migrainosus, not intractable    Uterine fibroid    Mood disorder (HCC)    VIJAY (obstructive sleep apnea)- intol CPAP    Hepatic steatosis- ultrasound May 2023       Current Outpatient Medications   Medication Sig Dispense Refill    escitalopram (LEXAPRO) 5 MG tablet Take 1 tablet by mouth daily 90 tablet 1     No current facility-administered medications for this visit.       Allergies   Allergen Reactions    Wellbutrin [Bupropion] Other (See Comments)     Increased anxiety       Review of Systems   Constitutional:  Negative for activity change and fever.   Respiratory:  Negative for shortness of breath.    Cardiovascular:  Negative for chest pain, palpitations and leg swelling.   Gastrointestinal:  Negative for abdominal pain.   Neurological:  Negative for dizziness and headaches.   Psychiatric/Behavioral:  Negative for dysphoric mood, sleep disturbance and suicidal ideas. The patient is not nervous/anxious.        Vitals:    01/08/24 0827   BP: 104/72   Site: Left Upper Arm   Position: Sitting   Cuff Size: Medium Adult   Pulse: 63   SpO2: 98%   Weight: 96.6 kg (213 lb)   Height: 1.702 m (5' 7\")       Body mass index is 33.36 kg/m².     Wt Readings from Last 3 Encounters:   01/08/24 96.6 kg (213 lb)   07/06/23 93.4 kg (206 lb)   04/18/23 97.1 kg (214 lb)       BP Readings from Last 3 Encounters:   01/08/24 104/72   07/06/23 100/76

## 2024-07-08 ENCOUNTER — OFFICE VISIT (OUTPATIENT)
Dept: FAMILY MEDICINE CLINIC | Age: 29
End: 2024-07-08
Payer: COMMERCIAL

## 2024-07-08 VITALS
WEIGHT: 212 LBS | BODY MASS INDEX: 33.27 KG/M2 | DIASTOLIC BLOOD PRESSURE: 72 MMHG | HEART RATE: 66 BPM | SYSTOLIC BLOOD PRESSURE: 114 MMHG | OXYGEN SATURATION: 99 % | HEIGHT: 67 IN

## 2024-07-08 DIAGNOSIS — Z00.00 ENCOUNTER FOR WELL ADULT EXAM WITHOUT ABNORMAL FINDINGS: Primary | ICD-10-CM

## 2024-07-08 DIAGNOSIS — R53.82 CHRONIC FATIGUE: ICD-10-CM

## 2024-07-08 DIAGNOSIS — N94.6 DYSMENORRHEA: ICD-10-CM

## 2024-07-08 DIAGNOSIS — Z00.00 ENCOUNTER FOR WELL ADULT EXAM WITHOUT ABNORMAL FINDINGS: ICD-10-CM

## 2024-07-08 DIAGNOSIS — N97.9 INFERTILITY, FEMALE: ICD-10-CM

## 2024-07-08 DIAGNOSIS — F39 MOOD DISORDER (HCC): ICD-10-CM

## 2024-07-08 DIAGNOSIS — E66.9 OBESITY (BMI 30-39.9): ICD-10-CM

## 2024-07-08 DIAGNOSIS — D25.9 UTERINE LEIOMYOMA, UNSPECIFIED LOCATION: ICD-10-CM

## 2024-07-08 LAB
ALBUMIN SERPL-MCNC: 4.7 G/DL (ref 3.4–5)
ALBUMIN/GLOB SERPL: 2 {RATIO} (ref 1.1–2.2)
ALP SERPL-CCNC: 105 U/L (ref 40–129)
ALT SERPL-CCNC: 61 U/L (ref 10–40)
ANION GAP SERPL CALCULATED.3IONS-SCNC: 11 MMOL/L (ref 3–16)
AST SERPL-CCNC: 37 U/L (ref 15–37)
BASOPHILS # BLD: 0.1 K/UL (ref 0–0.2)
BASOPHILS NFR BLD: 0.8 %
BILIRUB SERPL-MCNC: 0.5 MG/DL (ref 0–1)
BUN SERPL-MCNC: 12 MG/DL (ref 7–20)
CALCIUM SERPL-MCNC: 10 MG/DL (ref 8.3–10.6)
CHLORIDE SERPL-SCNC: 102 MMOL/L (ref 99–110)
CHOLEST SERPL-MCNC: 240 MG/DL (ref 0–199)
CO2 SERPL-SCNC: 26 MMOL/L (ref 21–32)
CREAT SERPL-MCNC: 0.8 MG/DL (ref 0.6–1.1)
DEPRECATED RDW RBC AUTO: 13.1 % (ref 12.4–15.4)
EOSINOPHIL # BLD: 0.3 K/UL (ref 0–0.6)
EOSINOPHIL NFR BLD: 4 %
GFR SERPLBLD CREATININE-BSD FMLA CKD-EPI: >90 ML/MIN/{1.73_M2}
GLUCOSE SERPL-MCNC: 79 MG/DL (ref 70–99)
HCT VFR BLD AUTO: 39.2 % (ref 36–48)
HDLC SERPL-MCNC: 40 MG/DL (ref 40–60)
HGB BLD-MCNC: 13.4 G/DL (ref 12–16)
LDLC SERPL CALC-MCNC: 140 MG/DL
LYMPHOCYTES # BLD: 2.4 K/UL (ref 1–5.1)
LYMPHOCYTES NFR BLD: 36.6 %
MCH RBC QN AUTO: 30.2 PG (ref 26–34)
MCHC RBC AUTO-ENTMCNC: 34.1 G/DL (ref 31–36)
MCV RBC AUTO: 88.5 FL (ref 80–100)
MONOCYTES # BLD: 0.5 K/UL (ref 0–1.3)
MONOCYTES NFR BLD: 7.1 %
NEUTROPHILS # BLD: 3.4 K/UL (ref 1.7–7.7)
NEUTROPHILS NFR BLD: 51.5 %
PLATELET # BLD AUTO: 287 K/UL (ref 135–450)
PMV BLD AUTO: 9.4 FL (ref 5–10.5)
POTASSIUM SERPL-SCNC: 4.7 MMOL/L (ref 3.5–5.1)
PROT SERPL-MCNC: 7.1 G/DL (ref 6.4–8.2)
RBC # BLD AUTO: 4.43 M/UL (ref 4–5.2)
SODIUM SERPL-SCNC: 139 MMOL/L (ref 136–145)
TRIGL SERPL-MCNC: 298 MG/DL (ref 0–150)
TSH SERPL DL<=0.005 MIU/L-ACNC: 2.11 UIU/ML (ref 0.27–4.2)
VLDLC SERPL CALC-MCNC: 60 MG/DL
WBC # BLD AUTO: 6.6 K/UL (ref 4–11)

## 2024-07-08 PROCEDURE — 99395 PREV VISIT EST AGE 18-39: CPT

## 2024-07-08 SDOH — ECONOMIC STABILITY: FOOD INSECURITY: WITHIN THE PAST 12 MONTHS, YOU WORRIED THAT YOUR FOOD WOULD RUN OUT BEFORE YOU GOT MONEY TO BUY MORE.: NEVER TRUE

## 2024-07-08 SDOH — ECONOMIC STABILITY: INCOME INSECURITY: HOW HARD IS IT FOR YOU TO PAY FOR THE VERY BASICS LIKE FOOD, HOUSING, MEDICAL CARE, AND HEATING?: NOT VERY HARD

## 2024-07-08 SDOH — ECONOMIC STABILITY: FOOD INSECURITY: WITHIN THE PAST 12 MONTHS, THE FOOD YOU BOUGHT JUST DIDN'T LAST AND YOU DIDN'T HAVE MONEY TO GET MORE.: NEVER TRUE

## 2024-07-08 NOTE — PROGRESS NOTES
Informed her of this. She said she will get her Hep B vaccine when she comes back in October.  
Future  -     CBC with Auto Differential; Future  Discussed importance of healthy lifestyle habits including diet, aerobic exercise, routine dental/eye/gyn exams and  preventative screenings and vaccinations.  Mood disorder (HCC)  Chronic- controlled  She would like to come off her escitalopram- her mood is good- not feeling down/depress or overly anxious  OK to stop escitalopram 5 mg  Chronic fatigue  -     Comprehensive Metabolic Panel; Future  -     TSH; Future  -     CBC with Auto Differential; Future  Chronic- stable  Still feeling fatigued despite lifestyle modifications  Will check labs today  She does have diagnosis of VIJAY but does not tolerate CPAP- may need to revisit this  Advised to continue efforts with diet and exercise  Good sleep hygiene was reinforced  Obesity (BMI 30-39.9)  -     Tirzepatide-Weight Management 2.5 MG/0.5ML SOAJ; Inject 2.5 mg into the skin once a week  Chronic- uncontrolled  Despite her efforts with diet and exercise, she has been unable to lose weight  Will start tirzepatide 2.5 mg weekly to aid in weight loss  Discussed mediations risks, benefits and side effects- she verbalized understanding  Advised to continue her efforts with lifestyle modifications  Dysmenorrhea  Chronic- stable  Was seeing GYN but told OCP was treatment, but she is interested in starting a family  She gets some relief from NSAIDs- continue PRN naproxen  Infertility, female  Chronic- uncontrolled  Has been off OCP x4 years, no contraception, intermittently actively trying to conceive  Will refer her to see Dr. Keys for fertility  Uterine leiomyoma, unspecified location  Chronic- stable  Was seen by gyn for this- monitoring

## 2024-07-10 ENCOUNTER — TELEPHONE (OUTPATIENT)
Dept: ADMINISTRATIVE | Age: 29
End: 2024-07-10

## 2024-07-10 NOTE — TELEPHONE ENCOUNTER
Submitted PA for Zepbound 2.5MG/0.5ML pen-injectors   Via [facility]MM Key: [id number] STATUS: PENDING.    Follow up done daily; if no decision with in three days we will refax.  If another three days goes by with no decision will call the insurance for status.

## 2024-07-11 NOTE — TELEPHONE ENCOUNTER
The medication is APPROVED.    Request Reference Number: [id number]. ZEPBOUND INJ 2.5MG is approved through 01/10/2025. Your patient may now fill this prescription and it will be covered.     If this requires a response please respond to the pool ( [facility] MEDICATION PRE-AUTH).      Thank you please advise patient.

## 2024-07-23 DIAGNOSIS — F39 MOOD DISORDER (HCC): ICD-10-CM

## 2024-07-23 RX ORDER — ESCITALOPRAM OXALATE 5 MG/1
5 TABLET ORAL DAILY
Qty: 90 TABLET | Refills: 0 | OUTPATIENT
Start: 2024-07-23

## 2024-08-26 ENCOUNTER — OFFICE VISIT (OUTPATIENT)
Dept: FAMILY MEDICINE CLINIC | Age: 29
End: 2024-08-26
Payer: COMMERCIAL

## 2024-08-26 VITALS
DIASTOLIC BLOOD PRESSURE: 68 MMHG | OXYGEN SATURATION: 99 % | BODY MASS INDEX: 31.8 KG/M2 | HEIGHT: 67 IN | WEIGHT: 202.6 LBS | HEART RATE: 57 BPM | SYSTOLIC BLOOD PRESSURE: 116 MMHG

## 2024-08-26 DIAGNOSIS — E66.9 OBESITY (BMI 30-39.9): ICD-10-CM

## 2024-08-26 DIAGNOSIS — D25.9 UTERINE LEIOMYOMA, UNSPECIFIED LOCATION: ICD-10-CM

## 2024-08-26 DIAGNOSIS — Z23 NEED FOR HEPATITIS B VACCINATION: ICD-10-CM

## 2024-08-26 DIAGNOSIS — N94.6 DYSMENORRHEA: Primary | ICD-10-CM

## 2024-08-26 PROCEDURE — 1036F TOBACCO NON-USER: CPT

## 2024-08-26 PROCEDURE — 99214 OFFICE O/P EST MOD 30 MIN: CPT

## 2024-08-26 PROCEDURE — G8417 CALC BMI ABV UP PARAM F/U: HCPCS

## 2024-08-26 PROCEDURE — G8427 DOCREV CUR MEDS BY ELIG CLIN: HCPCS

## 2024-08-26 PROCEDURE — 90739 HEPB VACC 2/4 DOSE ADULT IM: CPT

## 2024-08-26 PROCEDURE — 90471 IMMUNIZATION ADMIN: CPT

## 2024-08-26 ASSESSMENT — ENCOUNTER SYMPTOMS
ABDOMINAL PAIN: 0
SHORTNESS OF BREATH: 0

## 2024-08-26 NOTE — PROGRESS NOTES
8/26/2024    This is a 28 y.o. female   Chief Complaint   Patient presents with    Fibroids     Pt had a US in 2020 and had three fibroids, pt is currently having pelvic pain so she would like to inquire about another US.    .    MATTHEW Jimenes is here with concerns regarding her menses.  At the end of July she had a heavier cycle with increased left sided pain  Cycle lasted about 5 days.    She just started her menses today  She is feeling pain again in her left lower abdomen    She has history of uterine fibroids    She also reports pain with intercourse    She is not on contraception- desires pregnancy  Scheduled to see Dr. Keys in October for this    Taking 600-800 mg ibuprofen twice daily along with caffeine which is helpful for menstrual pain    She was started on tirzepatide in early July  Tolerating medication well- down about 10 lbs  She has been working on healthy diet and exercising regularly     Patient Active Problem List   Diagnosis    Migraine with aura and without status migrainosus, not intractable    Uterine fibroid    Mood disorder (HCC)    VIJAY (obstructive sleep apnea)- intol CPAP    Hepatic steatosis- ultrasound May 2023    Dysmenorrhea       Current Outpatient Medications   Medication Sig Dispense Refill    Tirzepatide-Weight Management 5 MG/0.5ML SOAJ Inject 5 mg into the skin once a week 2 mL 1     No current facility-administered medications for this visit.       Allergies   Allergen Reactions    Wellbutrin [Bupropion] Other (See Comments)     Increased anxiety       Review of Systems   Constitutional:  Negative for activity change and fever.   Respiratory:  Negative for shortness of breath.    Cardiovascular:  Negative for chest pain, palpitations and leg swelling.   Gastrointestinal:  Negative for abdominal pain.   Genitourinary:  Positive for dyspareunia, menstrual problem and pelvic pain. Negative for vaginal discharge and vaginal pain.   Neurological:  Negative for dizziness and

## 2024-08-27 ENCOUNTER — HOSPITAL ENCOUNTER (OUTPATIENT)
Dept: ULTRASOUND IMAGING | Age: 29
Discharge: HOME | End: 2024-08-27
Payer: COMMERCIAL

## 2024-08-27 DIAGNOSIS — D25.9 UTERINE LEIOMYOMA, UNSPECIFIED LOCATION: ICD-10-CM

## 2024-08-27 DIAGNOSIS — D25.9 UTERINE LEIOMYOMA, UNSPECIFIED LOCATION: Primary | ICD-10-CM

## 2024-08-27 DIAGNOSIS — N97.9 INFERTILITY, FEMALE: ICD-10-CM

## 2024-08-27 DIAGNOSIS — N94.6 DYSMENORRHEA: ICD-10-CM

## 2024-08-27 PROCEDURE — 76856 US EXAM PELVIC COMPLETE: CPT

## 2024-08-27 PROCEDURE — 76830 TRANSVAGINAL US NON-OB: CPT

## 2024-09-30 ENCOUNTER — PATIENT MESSAGE (OUTPATIENT)
Dept: FAMILY MEDICINE CLINIC | Age: 29
End: 2024-09-30

## 2024-10-10 ENCOUNTER — OFFICE VISIT (OUTPATIENT)
Dept: FAMILY MEDICINE CLINIC | Age: 29
End: 2024-10-10
Payer: COMMERCIAL

## 2024-10-10 VITALS
RESPIRATION RATE: 16 BRPM | HEART RATE: 55 BPM | WEIGHT: 187 LBS | BODY MASS INDEX: 29.35 KG/M2 | OXYGEN SATURATION: 97 % | HEIGHT: 67 IN

## 2024-10-10 DIAGNOSIS — D25.9 UTERINE LEIOMYOMA, UNSPECIFIED LOCATION: ICD-10-CM

## 2024-10-10 DIAGNOSIS — E28.2 PCOS (POLYCYSTIC OVARIAN SYNDROME): ICD-10-CM

## 2024-10-10 DIAGNOSIS — N94.6 DYSMENORRHEA: Primary | ICD-10-CM

## 2024-10-10 PROCEDURE — G8417 CALC BMI ABV UP PARAM F/U: HCPCS | Performed by: FAMILY MEDICINE

## 2024-10-10 PROCEDURE — 1036F TOBACCO NON-USER: CPT | Performed by: FAMILY MEDICINE

## 2024-10-10 PROCEDURE — G8484 FLU IMMUNIZE NO ADMIN: HCPCS | Performed by: FAMILY MEDICINE

## 2024-10-10 PROCEDURE — 99213 OFFICE O/P EST LOW 20 MIN: CPT | Performed by: FAMILY MEDICINE

## 2024-10-10 PROCEDURE — G8427 DOCREV CUR MEDS BY ELIG CLIN: HCPCS | Performed by: FAMILY MEDICINE

## 2024-10-10 NOTE — PATIENT INSTRUCTIONS
Dr. Stewart Nava  [address], [place] [address]  Phone: [phone]    Devin Nieto Jr., MD   [place]  [email or web address]    Kayden Posadas MD and Dr. Sunita Campuzano  Address: [address] [place] [address] [address], [place] [address]  [phone]

## 2024-10-27 ENCOUNTER — TELEPHONE (OUTPATIENT)
Dept: FAMILY MEDICINE CLINIC | Age: 29
End: 2024-10-27

## 2024-10-27 DIAGNOSIS — E66.9 OBESITY (BMI 30-39.9): ICD-10-CM

## 2024-10-28 ENCOUNTER — PATIENT MESSAGE (OUTPATIENT)
Dept: FAMILY MEDICINE CLINIC | Age: 29
End: 2024-10-28

## 2024-10-28 RX ORDER — TIRZEPATIDE 5 MG/.5ML
INJECTION, SOLUTION SUBCUTANEOUS
Qty: 4 ML | Refills: 0 | Status: SHIPPED | OUTPATIENT
Start: 2024-10-28

## 2024-12-04 ENCOUNTER — PATIENT MESSAGE (OUTPATIENT)
Dept: FAMILY MEDICINE CLINIC | Age: 29
End: 2024-12-04

## 2024-12-04 DIAGNOSIS — D25.9 UTERINE LEIOMYOMA, UNSPECIFIED LOCATION: Primary | ICD-10-CM

## 2024-12-04 NOTE — TELEPHONE ENCOUNTER
Ok to fax referral.  Not sure if pt needs to ask for scans directly to have a hard copy  Signed and ready

## (undated) DEVICE — GOWN SIRUS NONREIN XL W/TWL: Brand: MEDLINE INDUSTRIES, INC.

## (undated) DEVICE — GLOVE SURG SZ 75 CRM LTX FREE POLYISOPRENE POLYMER BEAD ANTI

## (undated) DEVICE — SOLUTION IV IRRIG 250ML ST LF 0.9% SODIUM 2F7122

## (undated) DEVICE — NEEDLE HYPO 18GA L1.5IN THN WALL PIVOTING SHLD BVL ORIENTED

## (undated) DEVICE — BANDAGE COMPR W2INXL5YD TAN BRTH SELF ADH WRP W/ HND TEAR

## (undated) DEVICE — SHEET,DRAPE,53X77,STERILE: Brand: MEDLINE

## (undated) DEVICE — DRESSING PETRO W3XL3IN OIL EMUL N ADH GZ KNIT IMPREG CELOS

## (undated) DEVICE — GLOVE SURG SZ 65 CRM LTX FREE POLYISOPRENE POLYMER BEAD ANTI

## (undated) DEVICE — DRAPE HND W114XL142IN BLU POLYPR W O PCH FEN CRD AND TB HLDR

## (undated) DEVICE — MINOR SET UP PK

## (undated) DEVICE — SPONGE GZ W4XL4IN COT 12 PLY TYP VII WVN C FLD DSGN

## (undated) DEVICE — BANDAGE COMPR W4INXL12FT E DISP ESMARCH EVEN

## (undated) DEVICE — UNDERGLOVE SURG SZ 8 FNGR THK0.21MIL GRN LTX BEAD CUF

## (undated) DEVICE — APPLICATOR MEDICATED 26 CC SOLUTION HI LT ORNG CHLORAPREP

## (undated) DEVICE — SUTURE CHROMIC GUT SZ 4-0 L27IN ABSRB BRN FS-2 L19MM 3/8 635H

## (undated) DEVICE — GLOVE SURG SZ 65 L12IN FNGR THK79MIL GRN LTX FREE

## (undated) DEVICE — SYRINGE MED 10ML LUERLOCK TIP W/O SFTY DISP

## (undated) DEVICE — BANDAGE COMPR W4INXL15FT BGE E SGL LAYERED CLP CLSR

## (undated) DEVICE — NEEDLE HYPO 25GA L1.5IN BVL ORIENTED ECLIPSE

## (undated) DEVICE — ZIMMER® STERILE DISPOSABLE TOURNIQUET CUFF WITH PLC, DUAL PORT, SINGLE BLADDER, 18 IN. (46 CM)

## (undated) DEVICE — SOLUTION IRRIG 250ML 0.9% SOD CHL USP POUR PLAS BTL

## (undated) DEVICE — WRAP COHESIVE W2INXL5YD TAN SELF ADH BNDG HND NON STERILE TEAR CARING

## (undated) DEVICE — COVER LT HNDL BLU PLAS

## (undated) DEVICE — INTENDED FOR TISSUE SEPARATION, AND OTHER PROCEDURES THAT REQUIRE A SHARP SURGICAL BLADE TO PUNCTURE OR CUT.: Brand: BARD-PARKER ® STAINLESS STEEL BLADES

## (undated) DEVICE — SUTURE VCRL SZ 3-0 L27IN ABSRB UD L26MM SH 1/2 CIR J416H

## (undated) DEVICE — GOWN,SIRUS,POLYRNF,BRTHSLV,XL,30/CS: Brand: MEDLINE

## (undated) DEVICE — PADDING UNDERCAST W4INXL4YD 100% COT CRIMPED FINISH WBRL II